# Patient Record
Sex: FEMALE | Race: BLACK OR AFRICAN AMERICAN | NOT HISPANIC OR LATINO | URBAN - METROPOLITAN AREA
[De-identification: names, ages, dates, MRNs, and addresses within clinical notes are randomized per-mention and may not be internally consistent; named-entity substitution may affect disease eponyms.]

---

## 2022-07-06 ENCOUNTER — EVALUATION (OUTPATIENT)
Dept: PHYSICAL THERAPY | Facility: CLINIC | Age: 62
End: 2022-07-06
Payer: COMMERCIAL

## 2022-07-06 DIAGNOSIS — M25.562 PAIN IN BOTH KNEES, UNSPECIFIED CHRONICITY: Primary | ICD-10-CM

## 2022-07-06 DIAGNOSIS — R26.9 GAIT ABNORMALITY: ICD-10-CM

## 2022-07-06 DIAGNOSIS — M25.551 RIGHT HIP PAIN: ICD-10-CM

## 2022-07-06 DIAGNOSIS — M25.561 PAIN IN BOTH KNEES, UNSPECIFIED CHRONICITY: Primary | ICD-10-CM

## 2022-07-06 PROCEDURE — 97161 PT EVAL LOW COMPLEX 20 MIN: CPT

## 2022-07-06 NOTE — PROGRESS NOTES
PT Evaluation     Today's date: 2022  Patient name: Jean-Claude Quintero  : 1960  MRN: [id number]  Referring provider: Tip Bahena MD  Dx:   Encounter Diagnosis     ICD-10-CM    1  Pain in both knees, unspecified chronicity  M25 561     M25 562    2  Right hip pain  M25 551    3  Gait abnormality  R26 9                   Assessment  Assessment details: Jean-Claude Quintero is a 63 y o  female who presents with pain, decreased strength, decreased ROM and ambulatory dysfunction  Due to these impairments, patient has difficulty performing ADL's, recreational activities, ambulation, stair negotiation, transfers, and dressing  She demonstrates fair gait, valgus noted with stair negotiation, which correlates with considerable weakness in hip abductors R>L  Pain with knee flexion bilaterally contributes to limitations in donning and doffing shoes  Decreased endurance further limits her ability to ambulate extended periods such as with grocery shopping  Patient's clinical presentation is consistent with their referring diagnosis  Potential contributes from lumbar spine were not ruled out due to time constraints, further lumbar examination is advised to appropriately assess if lumbar spine is contributing to pain or reports of tingling in RLE  Patient has been educated in home exercise program and plan of care  Patient would benefit from skilled physical therapy services to address their aforementioned functional limitations and progress towards prior level of function and independence with home exercise program    Impairments: abnormal gait, abnormal or restricted ROM, activity intolerance, impaired balance, impaired physical strength, lacks appropriate home exercise program, pain with function, weight-bearing intolerance and poor body mechanics  Understanding of Dx/Px/POC: good   Prognosis: good    Goals  ST    Improve right hip ROM so that pt will be able to don/doff shoes without pain within 2-4 weeks   2   Improve right hip strength by 1/2 grade in all deficient planes within 2-4 weeks  3   Improve right hip and strength so that pt is able to ambulate with normal gait pattern for 2 miles within 2-4 weeks  4  Pt will be independent with HEP   5  Pt will demonstrate improved SLS to 30s EO to improve her tolerance for walking 1 mile on uneven track     LT  Improve right hip strength so that pt is able to negotiate stairs reciprocally without pain within 4-8 weeks  2  Improve SLS balance with eyes closed to greater than 15 sec within 4-8 weeks  3  Improve right hip strength to be able to squat and lift >20lbs without discomfort within 4-8 weeks  4  Pt will be independent with HEP upon discharge   5  Pt will be able to perform SLS at least 15s EC to allow her to walk unrestricted on uneven surfaces     Plan  Plan details: HEP development, stretching, strengthening, A/AA/PROM, joint mobilizations, posture education, STM/MI as needed to reduce muscle tension, balance and proprioceptive training, muscle reeducation, PLOC discussed and agreed upon with patient  Patient would benefit from: PT eval and skilled physical therapy  Planned modality interventions: cryotherapy and thermotherapy: hydrocollator packs  Planned therapy interventions: manual therapy, neuromuscular re-education, self care, therapeutic activities, therapeutic exercise and home exercise program  Frequency: 2x week  Duration in weeks: 12  Treatment plan discussed with: patient        Subjective Evaluation    History of Present Illness  Mechanism of injury: Azra Cochran reports her issues started with her knees  She notes that she has tried injections and a scope on the left  She notes that she also tried gel injections in the last few months which also didn't help  She notes that the right hip has also started hurting which her doctor believes is bursitis and gave an injection   She notes that with extended walking the knees swell up  She reports difficulty with ascending but able to walk reciprocally, has to do step to pattern leading with the right  She notes she avoids uneven surfaces  She notes that sleeping bothers the hip the most, reporting that the pain comes on no matter what position she is in  She notes that the injection did help her hip in regards to sleeping, but the pain is still there during the day  She notes she takes aleve which only takes the edge off the pain  She reports pain and difficulty with bending and straightening the knees  She notes that when the right knee locks up it feels like its on the lateral side, but the aching is in the front of the knee and is tender to the touch as well  She also notes that she gets intermittent tingling in the right lateral thigh  MRI performed on bilateral knees "years ago" which shows small tearing bilaterally, reports she had the left knee "cleaned out years ago"      Left knee:   Current: 3/10  Best: 3/10  Worst: 8/10    Right knee:   Current: 7/10  Best: 5/10  Worst: 10/10    Right hip: sharp/stabbing   Current: 4/10  Best: 4/10  Worst: 10/10  Quality of life: good    Pain  Quality: dull ache (right knee feels like it is going to out)  Relieving factors: rest  Aggravating factors: stair climbing, walking and lifting  Progression: worsening    Social Support  Steps to enter house: yes  Stairs in house: yes   Lives in: multiple-level home  Lives with: spouse    Employment status: not working  Hand dominance: right      Diagnostic Tests  X-ray: normal  MRI studies: abnormal (years ago - meniscus tears bilaterally)  Patient Goals  Patient goals for therapy: decreased pain, improved balance, increased motion, return to work, return to [employer] activities, independence with ADLs/IADLs and increased strength  Patient goal: be able to walk 5 miles a day, relieve hip pain to improve ability pushing a shopping cart        Objective     Static Posture     Comments  Weight shift to left     Palpation     Right   Tenderness of the gluteus medius and TFL  Tenderness     Right Hip   Tenderness in the greater trochanter  Left Knee   Tenderness in the patellar tendon  Right Knee   Tenderness in the patellar tendon  Neurological Testing     Sensation     Lumbar   Left   Intact: light touch    Right   Intact: light touch    Knee   Left Knee   Intact: light touch    Right Knee   Intact: light touch     Active Range of Motion   Left Hip   Flexion: 98 degrees   External rotation (90/90): 25 degrees   Internal rotation (90/90): 26 degrees     Right Hip   Flexion: 90 degrees   External rotation (90/90): 28 degrees   Internal rotation (90/90): 25 degrees   Left Knee   Hyperextension  Flexion: 110 degrees   Extension: -2 degrees     Right Knee   Flexion: 110 degrees   Extension: 1 (lacking 1 degree of ext ) degrees     Additional Active Range of Motion Details  Flexion: to knee   Extension: 80%  SL L: WFL  SB R: WFL      Mobility   Patellar Mobility:   Left Knee   Hypomobile: left medial, left lateral, left superior and left inferior    Right Knee   Hypomobile: medial, lateral, superior and inferior   Mechanical Assessment    Cervical      Thoracic      Lumbar    Standing flexion: repeated movements   Pain intensity: better  Pain level: decreased  Standing extension: repeated movements  Pain intensity: worse  Pain level: increased    Strength/Myotome Testing     Left Hip   Planes of Motion   Flexion: 4+  Extension: 4  Abduction: 4-  External rotation: 4-  Internal rotation: 4    Right Hip   Planes of Motion   Flexion: 4+  Extension: 4-  Abduction: 3+  External rotation: 3+  Internal rotation: 4-    Left Knee   Flexion: 4+  Extension: 4+    Right Knee   Flexion: 4  Extension: 4+    Additional Strength Details  Hip pain with resisted R hip internal rotation   Pain on R with MMT     Tests     Left Hip   Positive piriformis  Negative BENNETTMELANIE and bradly       Right Hip   Positive piriformis  Negative BENNETT and FADIR  Left Knee   Negative anterior drawer, lateral Tawana, medial Tawana, valgus stress test at 0 degrees, valgus stress test at 30 degrees, varus stress test at 0 degrees and varus stress test at 30 degrees  Right Knee   Positive lateral Tawana and medial Tawana  Negative anterior drawer, valgus stress test at 0 degrees, valgus stress test at 30 degrees, varus stress test at 0 degrees and varus stress test at 30 degrees  Additional Tests Details  + slump on R     Functional Assessment      Squat    Pain and sitting toward left side  Forward Step Up 8"   Left Leg  Pain  Right Leg  Pain and valgus  Forward Step Down 8"   Left Leg  Pain  Right Leg  Pain  Single Leg Stance - Eyes Open   Left  Trial 1: 16 seconds    Right  Trial 1: 12 seconds  Comments: Right hip and knee pain reported         Comments  STS: able to perform without UE use, considerable weight shift to left observed     General Comments:      Lumbar Comments  Further assessment of lumbar spine is advised, limited due to time constraints       Flowsheet Rows    Flowsheet Row Most Recent Value   PT/OT G-Codes    Current Score 29   Projected Score 56   FOTO information reviewed Yes             Precautions: standard   HEP created through [address] [place] [address], access code:  IE: 7/6     IE   Manuals 7/6                   Neuro Re-Ed                                Ther Ex    Piriformis stretch  Ed on performance    Mod chaparrita stretch    Hamstring stretch     Quad set/SLR     Bike     Supine hip ER/IR     Clamshell         Ther Activity            Gait Training            Modalities                Insurance:  AMA/CMS Eval/ Re-eval Auth #/ Referral # Total   Visits  Start date  Expiration date Extension  Visit limitation? PT only or  PT+OT?  Co-Insurance   [facility] 7/6        BOMN  $30 Co-pay                                                         AUTH #:  Date               Visits  Authed:  Used Remaining

## 2022-07-06 NOTE — LETTER
2022    Radha Bahena MD  [address] [place] [address]    Patient: Nato Champagne   YOB: 1960   Date of Visit: 2022     Encounter Diagnosis     ICD-10-CM    1  Pain in both knees, unspecified chronicity  M25 561     M25 562    2  Right hip pain  M25 551    3  Gait abnormality  R26 9        Dear Dr Bahena: Thank you for your recent referral of Nato Champagne  Please review the attached evaluation summary from Gloria's recent visit  Please verify that you agree with the plan of care by signing the attached order  If you have any questions or concerns, please do not hesitate to call  I sincerely appreciate the opportunity to share in the care of one of your patients and hope to have another opportunity to work with you in the near future  Sincerely,    Konrad Moffett, PT      Referring Provider:      I certify that I have read the below Plan of Care and certify the need for these services furnished under this plan of treatment while under my care  Radha Bahena MD  [address] [place] [address]  Via Fax: [phone]          PT Evaluation     Today's date: 2022  Patient name: Nato Champagne  : 1960  MRN: [id number]  Referring provider: Radha Bahena MD  Dx:   Encounter Diagnosis     ICD-10-CM    1  Pain in both knees, unspecified chronicity  M25 561     M25 562    2  Right hip pain  M25 551    3  Gait abnormality  R26 9                   Assessment  Assessment details: aNto Champagne is a 63 y o  female who presents with pain, decreased strength, decreased ROM and ambulatory dysfunction  Due to these impairments, patient has difficulty performing ADL's, recreational activities, ambulation, stair negotiation, transfers, and dressing  She demonstrates fair gait, valgus noted with stair negotiation, which correlates with considerable weakness in hip abductors R>L   Pain with knee flexion bilaterally contributes to limitations in donning and doffing shoes  Decreased endurance further limits her ability to ambulate extended periods such as with grocery shopping  Patient's clinical presentation is consistent with their referring diagnosis  Potential contributes from lumbar spine were not ruled out due to time constraints, further lumbar examination is advised to appropriately assess if lumbar spine is contributing to pain or reports of tingling in RLE  Patient has been educated in home exercise program and plan of care  Patient would benefit from skilled physical therapy services to address their aforementioned functional limitations and progress towards prior level of function and independence with home exercise program    Impairments: abnormal gait, abnormal or restricted ROM, activity intolerance, impaired balance, impaired physical strength, lacks appropriate home exercise program, pain with function, weight-bearing intolerance and poor body mechanics  Understanding of Dx/Px/POC: good   Prognosis: good    Goals  ST  Improve right hip ROM so that pt will be able to don/doff shoes without pain within 2-4 weeks  2   Improve right hip strength by 1/2 grade in all deficient planes within 2-4 weeks  3   Improve right hip and strength so that pt is able to ambulate with normal gait pattern for 2 miles within 2-4 weeks  4  Pt will be independent with HEP   5  Pt will demonstrate improved SLS to 30s EO to improve her tolerance for walking 1 mile on uneven track     LT  Improve right hip strength so that pt is able to negotiate stairs reciprocally without pain within 4-8 weeks  2  Improve SLS balance with eyes closed to greater than 15 sec within 4-8 weeks  3  Improve right hip strength to be able to squat and lift >20lbs without discomfort within 4-8 weeks  4  Pt will be independent with HEP upon discharge   5   Pt will be able to perform SLS at least 15s EC to allow her to walk unrestricted on uneven surfaces Plan  Plan details: HEP development, stretching, strengthening, A/AA/PROM, joint mobilizations, posture education, STM/MI as needed to reduce muscle tension, balance and proprioceptive training, muscle reeducation, PLOC discussed and agreed upon with patient  Patient would benefit from: PT eval and skilled physical therapy  Planned modality interventions: cryotherapy and thermotherapy: hydrocollator packs  Planned therapy interventions: manual therapy, neuromuscular re-education, self care, therapeutic activities, therapeutic exercise and home exercise program  Frequency: 2x week  Duration in weeks: 12  Treatment plan discussed with: patient        Subjective Evaluation    History of Present Illness  Mechanism of injury: Tomy Parham reports her issues started with her knees  She notes that she has tried injections and a scope on the left  She notes that she also tried gel injections in the last few months which also didn't help  She notes that the right hip has also started hurting which her doctor believes is bursitis and gave an injection  She notes that with extended walking the knees swell up  She reports difficulty with ascending but able to walk reciprocally, has to do step to pattern leading with the right  She notes she avoids uneven surfaces  She notes that sleeping bothers the hip the most, reporting that the pain comes on no matter what position she is in  She notes that the injection did help her hip in regards to sleeping, but the pain is still there during the day  She notes she takes aleve which only takes the edge off the pain  She reports pain and difficulty with bending and straightening the knees  She notes that when the right knee locks up it feels like its on the lateral side, but the aching is in the front of the knee and is tender to the touch as well  She also notes that she gets intermittent tingling in the right lateral thigh       MRI performed on bilateral knees "years ago" which shows small tearing bilaterally, reports she had the left knee "cleaned out years ago"  Left knee:   Current: 3/10  Best: 3/10  Worst: 8/10    Right knee:   Current: 7/10  Best: 5/10  Worst: 10/10    Right hip: sharp/stabbing   Current: 4/10  Best: 4/10  Worst: 10/10  Quality of life: good    Pain  Quality: dull ache (right knee feels like it is going to out)  Relieving factors: rest  Aggravating factors: stair climbing, walking and lifting  Progression: worsening    Social Support  Steps to enter house: yes  Stairs in house: yes   Lives in: multiple-level home  Lives with: spouse    Employment status: not working  Hand dominance: right      Diagnostic Tests  X-ray: normal  MRI studies: abnormal (years ago - meniscus tears bilaterally)  Patient Goals  Patient goals for therapy: decreased pain, improved balance, increased motion, return to work, return to [employer] activities, independence with ADLs/IADLs and increased strength  Patient goal: be able to walk 5 miles a day, relieve hip pain to improve ability pushing a shopping cart        Objective     Static Posture     Comments  Weight shift to left     Palpation     Right   Tenderness of the gluteus medius and TFL  Tenderness     Right Hip   Tenderness in the greater trochanter  Left Knee   Tenderness in the patellar tendon  Right Knee   Tenderness in the patellar tendon       Neurological Testing     Sensation     Lumbar   Left   Intact: light touch    Right   Intact: light touch    Knee   Left Knee   Intact: light touch    Right Knee   Intact: light touch     Active Range of Motion   Left Hip   Flexion: 98 degrees   External rotation (90/90): 25 degrees   Internal rotation (90/90): 26 degrees     Right Hip   Flexion: 90 degrees   External rotation (90/90): 28 degrees   Internal rotation (90/90): 25 degrees   Left Knee   Hyperextension  Flexion: 110 degrees   Extension: -2 degrees     Right Knee   Flexion: 110 degrees   Extension: 1 (lacking 1 degree of ext ) degrees     Additional Active Range of Motion Details  Flexion: to knee   Extension: 80%  SL L: WFL  SB R: WFL      Mobility   Patellar Mobility:   Left Knee   Hypomobile: left medial, left lateral, left superior and left inferior    Right Knee   Hypomobile: medial, lateral, superior and inferior   Mechanical Assessment    Cervical      Thoracic      Lumbar    Standing flexion: repeated movements   Pain intensity: better  Pain level: decreased  Standing extension: repeated movements  Pain intensity: worse  Pain level: increased    Strength/Myotome Testing     Left Hip   Planes of Motion   Flexion: 4+  Extension: 4  Abduction: 4-  External rotation: 4-  Internal rotation: 4    Right Hip   Planes of Motion   Flexion: 4+  Extension: 4-  Abduction: 3+  External rotation: 3+  Internal rotation: 4-    Left Knee   Flexion: 4+  Extension: 4+    Right Knee   Flexion: 4  Extension: 4+    Additional Strength Details  Hip pain with resisted R hip internal rotation   Pain on R with MMT     Tests     Left Hip   Positive piriformis  Negative BENNETT, FADIR and scour  Right Hip   Positive piriformis  Negative BENNETT and FADIR  Left Knee   Negative anterior drawer, lateral Tawana, medial Tawana, valgus stress test at 0 degrees, valgus stress test at 30 degrees, varus stress test at 0 degrees and varus stress test at 30 degrees  Right Knee   Positive lateral Tawana and medial Tawana  Negative anterior drawer, valgus stress test at 0 degrees, valgus stress test at 30 degrees, varus stress test at 0 degrees and varus stress test at 30 degrees  Additional Tests Details  + slump on R     Functional Assessment      Squat    Pain and sitting toward left side  Forward Step Up 8"   Left Leg  Pain  Right Leg  Pain and valgus  Forward Step Down 8"   Left Leg  Pain  Right Leg  Pain       Single Leg Stance - Eyes Open   Left  Trial 1: 16 seconds    Right  Trial 1: 12 seconds  Comments: Right hip and knee pain reported         Comments  STS: able to perform without UE use, considerable weight shift to left observed     General Comments:      Lumbar Comments  Further assessment of lumbar spine is advised, limited due to time constraints       Flowsheet Rows    Flowsheet Row Most Recent Value   PT/OT G-Codes    Current Score 29   Projected Score 56   FOTO information reviewed Yes             Precautions: standard   HEP created through [address] [place] [address], access code:  IE: 7/6     IE   Manuals 7/6                   Neuro Re-Ed                                Ther Ex    Piriformis stretch  Ed on performance    Mod chaparrita stretch    Hamstring stretch     Quad set/SLR     Bike     Supine hip ER/IR     Clamshell         Ther Activity            Gait Training            Modalities                Insurance:  AMA/CMS Eval/ Re-eval Auth #/ Referral # Total   Visits  Start date  Expiration date Extension  Visit limitation? PT only or  PT+OT?  Co-Insurance   [facility] 7/6        BOMN  $30 Co-pay                                                         AUTH #:  Date               Visits  Authed:  Used                Remaining

## 2022-07-12 ENCOUNTER — OFFICE VISIT (OUTPATIENT)
Dept: PHYSICAL THERAPY | Facility: CLINIC | Age: 62
End: 2022-07-12
Payer: COMMERCIAL

## 2022-07-12 DIAGNOSIS — M25.562 PAIN IN BOTH KNEES, UNSPECIFIED CHRONICITY: Primary | ICD-10-CM

## 2022-07-12 DIAGNOSIS — R26.9 GAIT ABNORMALITY: ICD-10-CM

## 2022-07-12 DIAGNOSIS — M25.561 PAIN IN BOTH KNEES, UNSPECIFIED CHRONICITY: Primary | ICD-10-CM

## 2022-07-12 DIAGNOSIS — M25.551 RIGHT HIP PAIN: ICD-10-CM

## 2022-07-12 PROCEDURE — 97140 MANUAL THERAPY 1/> REGIONS: CPT

## 2022-07-12 PROCEDURE — 97110 THERAPEUTIC EXERCISES: CPT

## 2022-07-12 NOTE — PROGRESS NOTES
Daily Note     Today's date: 2022  Patient name: Evelyne Mosley  : 1960  MRN: [id number]  Referring provider: Tip Bahena MD  Dx:   Encounter Diagnosis     ICD-10-CM    1  Pain in both knees, unspecified chronicity  M25 561     M25 562    2  Right hip pain  M25 551    3  Gait abnormality  R26 9                   Subjective: Evelyne Mosley reports that her right knee has not bothered her but the left has been acting up  She notes that the right hip has calmed down some but she hasnt been walking or doing much of her normal exercise  Objective: See treatment diary below      Assessment: Tolerated treatment well  Patellar hypomobility impacting extension on the left, as well tibiofemoral restrictions on the right  She deos demonstrate improved tolerance for SLR following mobilization  Educated to initiate the bike at home for ROM  Patient demonstrated fatigue post treatment, exhibited good technique with therapeutic exercises and would benefit from continued PT to address mobility deficits, weakness, and reduce pain  Plan: Continue per plan of care        Precautions: standard   HEP created through [address] [place] [address], access code: [facility]   IE:      2 IE   Manuals    Mobs  Inferior patellar mobs grade IV; end-range extension mobs                    Neuro Re-Ed                                        Ther Ex     Piriformis stretch   Ed on performance    Mod chaparrita stretch 3x30s ea    Hamstring stretch  2x30s ea    SLR  Flexion 15x belinda     Bike  5 min     Supine hip ER/IR      Clamshell      Heel slide with quad set  10x5s hold belinda     gastroc stretch  2x30s ea               Gait Training               Modalities

## 2022-07-14 ENCOUNTER — OFFICE VISIT (OUTPATIENT)
Dept: PHYSICAL THERAPY | Facility: CLINIC | Age: 62
End: 2022-07-14
Payer: COMMERCIAL

## 2022-07-14 DIAGNOSIS — M25.562 PAIN IN BOTH KNEES, UNSPECIFIED CHRONICITY: Primary | ICD-10-CM

## 2022-07-14 DIAGNOSIS — M25.561 PAIN IN BOTH KNEES, UNSPECIFIED CHRONICITY: Primary | ICD-10-CM

## 2022-07-14 DIAGNOSIS — R26.9 GAIT ABNORMALITY: ICD-10-CM

## 2022-07-14 DIAGNOSIS — M25.551 RIGHT HIP PAIN: ICD-10-CM

## 2022-07-14 PROCEDURE — 97110 THERAPEUTIC EXERCISES: CPT

## 2022-07-14 PROCEDURE — 97140 MANUAL THERAPY 1/> REGIONS: CPT

## 2022-07-14 PROCEDURE — 97112 NEUROMUSCULAR REEDUCATION: CPT

## 2022-07-19 ENCOUNTER — OFFICE VISIT (OUTPATIENT)
Dept: PHYSICAL THERAPY | Facility: CLINIC | Age: 62
End: 2022-07-19
Payer: COMMERCIAL

## 2022-07-19 DIAGNOSIS — M25.551 RIGHT HIP PAIN: ICD-10-CM

## 2022-07-19 DIAGNOSIS — R26.9 GAIT ABNORMALITY: ICD-10-CM

## 2022-07-19 DIAGNOSIS — M25.561 PAIN IN BOTH KNEES, UNSPECIFIED CHRONICITY: Primary | ICD-10-CM

## 2022-07-19 DIAGNOSIS — M25.562 PAIN IN BOTH KNEES, UNSPECIFIED CHRONICITY: Primary | ICD-10-CM

## 2022-07-19 PROCEDURE — 97110 THERAPEUTIC EXERCISES: CPT

## 2022-07-19 PROCEDURE — 97140 MANUAL THERAPY 1/> REGIONS: CPT

## 2022-07-19 NOTE — PROGRESS NOTES
Case Management Discharge Note    Final Note: home     Destination      No service has been selected for the patient.      Durable Medical Equipment      No service has been selected for the patient.      Dialysis/Infusion      No service has been selected for the patient.      Home Medical Care      No service has been selected for the patient.      Therapy      No service has been selected for the patient.      Community Resources      No service has been selected for the patient.        Transportation Services  Private: Car    Final Discharge Disposition Code: 01 - home or self-care   Daily Note     Today's date: 2022  Patient name: Trinh Robertson  : 1960  MRN: [id number]  Referring provider: Craig Bahena MD  Dx:   Encounter Diagnosis     ICD-10-CM    1  Pain in both knees, unspecified chronicity  M25 561     M25 562    2  Right hip pain  M25 551    3  Gait abnormality  R26 9                   Subjective: Trinh Robertson reports she had some knee pain after last session but it has decreased  She notes she was able to walk for 1 hour this morning  She notes that her right knee feels straighter  Objective: See treatment diary below      Assessment: Tolerated treatment well  Good tolerance for step ups today, reporting tightness but not pain  Educated on self patellar mobs to perform to address hypomobility  Reviewed sit to stands with use of airex, cues required to avoid dynamic knee valgus on R, improved following cues  Patient demonstrated fatigue post treatment, exhibited good technique with therapeutic exercises and would benefit from continued PT to progress mobility, strength, endurance, and facilitate improved performance of ADLs  Plan: Continue per plan of care        Precautions: standard   HEP created through [address] [place] [address], access code: [facility]   IE:      4 3 2 IE   Manuals    Mobs  Inferior patellar mobs, post tibfem grade IV, tibiofemoral distraction off edge of table  Inferior patellar mobs, post tibfem grade IV, tibiofemoral distraction off edge of table  Inferior patellar mobs grade IV; end-range extension mobs                          Neuro Re-Ed       Skaters   2x10 5s hold      Step ups  6" 2x10 ea  4" 15x belinda                                         Ther Ex       Piriformis stretch     Ed on performance    Mod chaparrita stretch   3x30s ea    Hamstring stretch    2x30s ea    SLR  LAQ 5lbs 2x15 ea   Flexion 15x belinda     Bike  5 min  5 min  5 min     Supine hip ER/IR        Clamshell        Heel slide with quad set  Knee flexion on stretch 10x ea   10x5s hold belinda     gastroc stretch    2x30s ea     STS  airex 2x10 airex 10x     Mini squat   10x     Gait Training                     Modalities                         Insurance:  AMA/CMS Eval/ Re-eval Auth #/ Referral # Total   Visits  Start date  Expiration date Extension  Visit limitation? PT only or  PT+OT?  Co-Insurance   [facility] 7/6        BOMN  $30 Co-pay                                                         AUTH #:  Date               Visits  Authed:  Used                Remaining

## 2022-07-21 ENCOUNTER — OFFICE VISIT (OUTPATIENT)
Dept: PHYSICAL THERAPY | Facility: CLINIC | Age: 62
End: 2022-07-21
Payer: COMMERCIAL

## 2022-07-21 DIAGNOSIS — M25.551 RIGHT HIP PAIN: ICD-10-CM

## 2022-07-21 DIAGNOSIS — M25.561 PAIN IN BOTH KNEES, UNSPECIFIED CHRONICITY: Primary | ICD-10-CM

## 2022-07-21 DIAGNOSIS — R26.9 GAIT ABNORMALITY: ICD-10-CM

## 2022-07-21 DIAGNOSIS — M25.562 PAIN IN BOTH KNEES, UNSPECIFIED CHRONICITY: Primary | ICD-10-CM

## 2022-07-21 PROCEDURE — 97140 MANUAL THERAPY 1/> REGIONS: CPT

## 2022-07-21 PROCEDURE — 97112 NEUROMUSCULAR REEDUCATION: CPT

## 2022-07-21 PROCEDURE — 97110 THERAPEUTIC EXERCISES: CPT

## 2022-07-21 NOTE — PROGRESS NOTES
Daily Note     Today's date: 2022  Patient name: Bessie Goel  : 1960  MRN: [id number]  Referring provider: Gucci Bahena MD  Dx:   Encounter Diagnosis     ICD-10-CM    1  Pain in both knees, unspecified chronicity  M25 561     M25 562    2  Right hip pain  M25 551    3  Gait abnormality  R26 9                   Subjective: Bessie Goel reports her knees have been feeling better  She notes that yesterday she went for a walk and had some knee and hip pain which subsided by the evening  Objective: See treatment diary below      Assessment: Tolerated treatment well  Good tolerance for clamshells and SLR abduction with cues provided  Increased difficulty with hip ABD R>L  addition of step downs today with pelvic drop noted bilaterally  Addition of hip drops to promote unerstanding of neutral pelvis, good tolerance  Patient demonstrated fatigue post treatment, exhibited good technique with therapeutic exercises and would benefit from continued PT to progress strength, endurance and tolerance for ADLS  Plan: Continue per plan of care  Precautions: standard   HEP created through [address] [place] [address], access code: [email or web address]   IE:      5 4 3 2 IE   Manuals    Mobs  tibiofemoral distraction off edge of table;  Inferior patellar mobs, Inferior patellar mobs, post tibfem grade IV, tibiofemoral distraction off edge of table  Inferior patellar mobs, post tibfem grade IV, tibiofemoral distraction off edge of table  Inferior patellar mobs grade IV; end-range extension mobs                             Neuro Re-Ed        Skaters    2x10 5s hold      Step ups   6" 2x10 ea  4" 15x belinda      Step downs  10x ea 4"        Hip drops standing  10x ea at mirror                                Ther Ex        Piriformis stretch      Ed on performance    Mod chaparrita stretch    3x30s ea    Hamstring stretch     2x30s ea    SLR  ABD 3x5 belinda  LAQ 5lbs 2x15 ea   Flexion 15x belinda     Bike 5 min  5 min  5 min     Supine hip ER/IR         Clamshell  2x10        Heel slide with quad set   Knee flexion on stretch 10x ea   10x5s hold belinda     gastroc stretch     2x30s ea     STS   airex 2x10 airex 10x     Mini squat  2x10 at high low table   10x     SLR ABD + flex                        Modalities                            Insurance:  AMA/CMS Eval/ Orval Jetty #/ Referral # Total   Visits  Start date  Expiration date Extension  Visit limitation? PT only or  PT+OT?  Co-Insurance   [facility] 7/6        BOMN  $30 Co-pay                                                         AUTH #:  Date               Visits  Authed:  Used                Remaining

## 2022-07-25 ENCOUNTER — OFFICE VISIT (OUTPATIENT)
Dept: PHYSICAL THERAPY | Facility: CLINIC | Age: 62
End: 2022-07-25
Payer: COMMERCIAL

## 2022-07-25 DIAGNOSIS — M25.551 RIGHT HIP PAIN: ICD-10-CM

## 2022-07-25 DIAGNOSIS — M25.561 PAIN IN BOTH KNEES, UNSPECIFIED CHRONICITY: Primary | ICD-10-CM

## 2022-07-25 DIAGNOSIS — M25.562 PAIN IN BOTH KNEES, UNSPECIFIED CHRONICITY: Primary | ICD-10-CM

## 2022-07-25 DIAGNOSIS — R26.9 GAIT ABNORMALITY: ICD-10-CM

## 2022-07-25 PROCEDURE — 97140 MANUAL THERAPY 1/> REGIONS: CPT

## 2022-07-25 PROCEDURE — 97110 THERAPEUTIC EXERCISES: CPT

## 2022-07-25 NOTE — PROGRESS NOTES
Daily Note     Today's date: 2022  Patient name: Ed Santamaria  : 1960  MRN: [id number]  Referring provider: Giovanna Bahena MD  Dx:   Encounter Diagnosis     ICD-10-CM    1  Pain in both knees, unspecified chronicity  M25 561     M25 562    2  Right hip pain  M25 551    3  Gait abnormality  R26 9                   Subjective: Ed Santamaria reports she was able to do some walking on Friday and could "feel the hip" but notes it wasn't painful like it has been previously  She notes the knees feel still  Objective: See treatment diary below      Assessment: Tolerated treatment well  Hypomobility into extension noted on the right, addressed with mobilizations, hamstring and gastroc stretching, and TKEs  Educated on performance of low load prolonged knee extension on R  Weakness with hip ABD in sidelying as well standing hip drops  Patient demonstrated fatigue post treatment, exhibited good technique with therapeutic exercises and would benefit from continued PT to progress strengthening, range of motion, and facilitate progression to ADLs  Plan: Continue per plan of care  Precautions: standard   HEP created through [address] [place] [address], access code: [facility]   IE:      6 5 4 3 2   Manuals    Mobs  End-range ext mobs on R 4 min  tibiofemoral distraction off edge of table;  Inferior patellar mobs, Inferior patellar mobs, post tibfem grade IV, tibiofemoral distraction off edge of table  Inferior patellar mobs, post tibfem grade IV, tibiofemoral distraction off edge of table  Inferior patellar mobs grade IV; end-range extension mobs                            Neuro Re-Ed        Skaters     2x10 5s hold     Step ups    6" 2x10 ea  4" 15x belinda     Step downs   10x ea 4"       Hip drops standing  2x10 belinda   10x ea at mirror                               Ther Ex        Piriformis stretch         Mod chaparrita stretch     3x30s ea   Hamstring stretch  3x30s     2x30s ea SLR  abd 2x10 belinda  ABD 3x5 belinda  LAQ 5lbs 2x15 ea   Flexion 15x belinda    Bike  5 min   5 min  5 min  5 min    Supine hip ER/IR         Clamshell  2x10 belinda 2x10       Heel slide with quad set    Knee flexion on stretch 10x ea   10x5s hold belinda    gastroc stretch  3x30s     2x30s ea    STS  10x with airex   10x no airex   airex 2x10 airex 10x    Mini squat   2x10 at high low table   10x    bridge 3x10 green                        Modalities                            Insurance:  AMA/CMS Eval/ Michael Lao #/ Referral # Visit limitation? PT only or  PT+OT?  Co-Insurance   [facility] 7/6 No auth  BOMN  $30 Co-pay

## 2022-07-27 ENCOUNTER — OFFICE VISIT (OUTPATIENT)
Dept: PHYSICAL THERAPY | Facility: CLINIC | Age: 62
End: 2022-07-27
Payer: COMMERCIAL

## 2022-07-27 DIAGNOSIS — M25.551 RIGHT HIP PAIN: ICD-10-CM

## 2022-07-27 DIAGNOSIS — M25.562 PAIN IN BOTH KNEES, UNSPECIFIED CHRONICITY: Primary | ICD-10-CM

## 2022-07-27 DIAGNOSIS — M25.561 PAIN IN BOTH KNEES, UNSPECIFIED CHRONICITY: Primary | ICD-10-CM

## 2022-07-27 DIAGNOSIS — R26.9 GAIT ABNORMALITY: ICD-10-CM

## 2022-07-27 PROCEDURE — 97110 THERAPEUTIC EXERCISES: CPT

## 2022-07-27 PROCEDURE — 97140 MANUAL THERAPY 1/> REGIONS: CPT

## 2022-07-27 NOTE — PROGRESS NOTES
Daily Note     Today's date: 2022  Patient name: Albaro Bland  : 1960  MRN: [id number]  Referring provider: Dylon Bahena MD  Dx:   Encounter Diagnosis     ICD-10-CM    1  Pain in both knees, unspecified chronicity  M25 561     M25 562    2  Right hip pain  M25 551    3  Gait abnormality  R26 9                   Subjective: Albaro Bland reports that her posterior right knee and medial left knee were a bit irritated after last session  She does note that she has been doing some walking daily and that today her knees are feeling better  She reports that the posterior right knee is not quite as tight  Objective: See treatment diary below      Assessment: Tolerated treatment well  Improved knee extension following end-range mobilizations, HS contract relax, and gastroc stretching  Addition of resisted retro walking to promote terminal knee extension, good tolerance  Good form noted step ups as well as sit to stands, no increase in pain  Patient demonstrated fatigue post treatment, exhibited good technique with therapeutic exercises and would benefit from continued PT to progress end-range extension on right, quad strength, normalize gait and improve endurance with ADLs and walking  Plan: Continue per plan of care  Precautions: standard   HEP created through [address] [place] [address], access code: [facility]   IE:      7 6 5 4   Manuals    Mobs  End-range ext mobs on R 4 min End-range ext mobs on R 4 min  tibiofemoral distraction off edge of table;  Inferior patellar mobs, Inferior patellar mobs, post tibfem grade IV, tibiofemoral distraction off edge of table    Contract relax HS x 4 min R                     Neuro Re-Ed       Skaters        Step ups  Step up with " 10x ea    6" 2x10 ea    Step downs    10x ea 4"     Hip drops standing   2x10 belinda   10x ea at mirror                          Ther Ex       Piriformis stretch        Mod chaparrita stretch Hamstring stretch  Seated 2x30s ea  3x30s      SLR  Standing hip ABD 2x10  abd 2x10 belinda  ABD 3x5 belinda  LAQ 5lbs 2x15 ea    Bike  5 min  5 min   5 min    Supine hip ER/IR        Clamshell   2x10 belinda 2x10     Heel slide with quad set     Knee flexion on stretch 10x ea    gastroc stretch  3x30s belinda  3x30s      STS  10x with airex  10x with airex   10x no airex   airex 2x10   Mini squat    2x10 at high low table     bridge  3x10 green      Backward walk at [employer] 3 6 5 laps              Modalities                         Insurance:  AMA/CMS Eval/ Camila Began #/ Referral # Visit limitation? PT only or  PT+OT?  Co-Insurance   [facility] 7/6 No auth  BOMN  $30 Co-pay

## 2022-08-01 ENCOUNTER — OFFICE VISIT (OUTPATIENT)
Dept: PHYSICAL THERAPY | Facility: CLINIC | Age: 62
End: 2022-08-01
Payer: COMMERCIAL

## 2022-08-01 DIAGNOSIS — R26.9 GAIT ABNORMALITY: ICD-10-CM

## 2022-08-01 DIAGNOSIS — M25.551 RIGHT HIP PAIN: ICD-10-CM

## 2022-08-01 DIAGNOSIS — M25.562 PAIN IN BOTH KNEES, UNSPECIFIED CHRONICITY: Primary | ICD-10-CM

## 2022-08-01 DIAGNOSIS — M25.561 PAIN IN BOTH KNEES, UNSPECIFIED CHRONICITY: Primary | ICD-10-CM

## 2022-08-01 PROCEDURE — 97110 THERAPEUTIC EXERCISES: CPT | Performed by: PHYSICAL THERAPIST

## 2022-08-01 PROCEDURE — 97112 NEUROMUSCULAR REEDUCATION: CPT | Performed by: PHYSICAL THERAPIST

## 2022-08-01 NOTE — PROGRESS NOTES
Daily Note     Today's date: 2022  Patient name: Carlos Alberto Robles  : 1960  MRN: [id number]  Referring provider: Caryl Bahena MD  Dx:   Encounter Diagnosis     ICD-10-CM    1  Pain in both knees, unspecified chronicity  M25 561     M25 562    2  Right hip pain  M25 551    3  Gait abnormality  R26 9                   Subjective: Carlos Alberto Robles states she was chasing her granddaughter around this weekend as well as drove to and from [facility]  She states as a result her knees were a little sore and her hip was very sore from drive  Objective: See treatment diary below      Assessment: Tolerated treatment well  Patient with continued lack of TKE R>L, poor inferior patellar mobility  She did report slight decreased right knee pain with manual medial glide of patella during squats and may benefit from trial of medial taping in future  Plan: Continue per plan of care  Precautions: standard   HEP created through [address] [place] [address], access code: [facility]   IE:  6 5 4   Manuals    Mobs  Inf patella B  Med pat glide on R during squats decreased pain  End-range ext B End-range ext mobs on R 4 min End-range ext mobs on R 4 min  tibiofemoral distraction off edge of table;  Inferior patellar mobs, Inferior patellar mobs, post tibfem grade IV, tibiofemoral distraction off edge of table    Contract relax  HS x 4 min R                       Neuro Re-Ed        Skaters         Step ups  Step ups 4" 10x B  6" 20x B Step up with " 10x ea    6" 2x10 ea    Step downs     10x ea 4"     Hip drops standing    2x10 belinda   10x ea at mirror     Seated on/off 6" step w/quad set + SLR 10x B cues to avoid hip ER       Quad sets 15x5s B               Ther Ex        Piriformis stretch         Mod chaparrita stretch        Hamstring stretch  2x30s B Seated 2x30s ea  3x30s      SLR   Standing hip ABD 2x10  abd 2x10 belinda  ABD 3x5 belinda  LAQ 5lbs 2x15 ea    Bike  6 min 5 min  5 min   5 min Supine hip ER/IR         Clamshell    2x10 belinda 2x10     Heel slide with quad set      Knee flexion on stretch 10x ea    gastroc stretch   3x30s belinda  3x30s      STS   10x with airex  10x with airex   10x no airex   airex 2x10   Mini squat     2x10 at high low table     bridge   3x10 green      Backward walk at [employer]  3 6 5 laps               Modalities                            Insurance:  AMA/LE [place]/ Josselyn Albert #/ Referral # Visit limitation? PT only or  PT+OT?  Co-Insurance   [facility] 7/6 No auth  BOMN  $30 Co-pay

## 2022-08-03 ENCOUNTER — APPOINTMENT (OUTPATIENT)
Dept: PHYSICAL THERAPY | Facility: CLINIC | Age: 62
End: 2022-08-03
Payer: COMMERCIAL

## 2022-08-03 NOTE — PROGRESS NOTES
PT Evaluation     Today's date: 8/3/2022  Patient name: Monique Galvan  : 1960  MRN: [id number]  Referring provider: Giselle Bahena MD  Dx:   Encounter Diagnosis     ICD-10-CM    1  Pain in both knees, unspecified chronicity  M25 561     M25 562    2  Right hip pain  M25 551    3  Gait abnormality  R26 9                   Assessment  Assessment details: Monique Galvan has been seen for 8 visits of OPPT in regards to bilateral knee pain and right hip pain  She demonstrates gains in pain free knee motion, most notably extension on the right  Slow gains in patellar mobility noted and pt is proficient in self patellar mobs  Weakness limits negotiation on stairs and with transfers  she would benefit from continued skilled OPPT to address remaining ROM restrictions, strength deficits, and improve pts tolerance for transfers, extended ambulation, don/dof shoes, dressing and regular exercise program      is a 63 y o  female who presents with pain, decreased strength, decreased ROM and ambulatory dysfunction  Due to these impairments, patient has difficulty performing ADL's, recreational activities, ambulation, stair negotiation, transfers, and dressing  She demonstrates fair gait, valgus noted with stair negotiation, which correlates with considerable weakness in hip abductors R>L  Pain with knee flexion bilaterally contributes to limitations in donning and doffing shoes  Decreased endurance further limits her ability to ambulate extended periods such as with grocery shopping  Patient's clinical presentation is consistent with their referring diagnosis  Potential contributes from lumbar spine were not ruled out due to time constraints, further lumbar examination is advised to appropriately assess if lumbar spine is contributing to pain or reports of tingling in RLE  Patient has been educated in home exercise program and plan of care   Patient would benefit from skilled physical therapy services to address their aforementioned functional limitations and progress towards prior level of function and independence with home exercise program    Impairments: abnormal gait, abnormal or restricted ROM, activity intolerance, impaired balance, impaired physical strength, lacks appropriate home exercise program, pain with function, weight-bearing intolerance and poor body mechanics  Understanding of Dx/Px/POC: good   Prognosis: good    Goals  ST  Improve right hip ROM so that pt will be able to don/doff shoes without pain within 2-4 weeks  2   Improve right hip strength by 1/2 grade in all deficient planes within 2-4 weeks  3   Improve right hip and strength so that pt is able to ambulate with normal gait pattern for 2 miles within 2-4 weeks  4  Pt will be independent with HEP - MET  5  Pt will demonstrate improved SLS to 30s EO to improve her tolerance for walking 1 mile on uneven track     LT  Improve right hip strength so that pt is able to negotiate stairs reciprocally without pain within 4-8 weeks  2  Improve SLS balance with eyes closed to greater than 15 sec within 4-8 weeks  3  Improve right hip strength to be able to squat and lift >20lbs without discomfort within 4-8 weeks  4  Pt will be independent with HEP upon discharge   5  Pt will be able to perform SLS at least 15s EC to allow her to walk unrestricted on uneven surfaces     Plan  Plan details: HEP development, stretching, strengthening, A/AA/PROM, joint mobilizations, posture education, STM/MI as needed to reduce muscle tension, balance and proprioceptive training, muscle reeducation, PLOC discussed and agreed upon with patient      Patient would benefit from: PT eval and skilled physical therapy  Planned modality interventions: cryotherapy and thermotherapy: hydrocollator packs  Planned therapy interventions: manual therapy, neuromuscular re-education, self care, therapeutic activities, therapeutic exercise and home exercise program  Frequency: 2x week  Duration in weeks: 12  Treatment plan discussed with: patient        Subjective Evaluation    History of Present Illness  Mechanism of injury: Mercy Carmichael reports her issues started with her knees  She notes that she has tried injections and a scope on the left  She notes that she also tried gel injections in the last few months which also didn't help  She notes that the right hip has also started hurting which her doctor believes is bursitis and gave an injection  She notes that with extended walking the knees swell up  She reports difficulty with ascending but able to walk reciprocally, has to do step to pattern leading with the right  She notes she avoids uneven surfaces  She notes that sleeping bothers the hip the most, reporting that the pain comes on no matter what position she is in  She notes that the injection did help her hip in regards to sleeping, but the pain is still there during the day  She notes she takes aleve which only takes the edge off the pain  She reports pain and difficulty with bending and straightening the knees  She notes that when the right knee locks up it feels like its on the lateral side, but the aching is in the front of the knee and is tender to the touch as well  She also notes that she gets intermittent tingling in the right lateral thigh  MRI performed on bilateral knees "years ago" which shows small tearing bilaterally, reports she had the left knee "cleaned out years ago"      Left knee:   Current: 3/10  Best: 3/10  Worst: 8/10    Right knee:   Current: 7/10  Best: 5/10  Worst: 10/10    Right hip: sharp/stabbing   Current: 4/10  Best: 4/10  Worst: 10/10  Quality of life: good    Pain  Quality: dull ache (right knee feels like it is going to out)  Relieving factors: rest  Aggravating factors: stair climbing, walking and lifting  Progression: worsening    Social Support  Steps to enter house: yes  Stairs in house: yes   Lives in: multiple-level home  Lives with: spouse    Employment status: not working  Hand dominance: right      Diagnostic Tests  X-ray: normal  MRI studies: abnormal (years ago - meniscus tears bilaterally)  Patient Goals  Patient goals for therapy: decreased pain, improved balance, increased motion, return to work, return to [employer] activities, independence with ADLs/IADLs and increased strength  Patient goal: be able to walk 5 miles a day, relieve hip pain to improve ability pushing a shopping cart        Objective     Static Posture     Comments  Weight shift to left     Palpation     Right   Tenderness of the gluteus medius and TFL  Tenderness     Right Hip   Tenderness in the greater trochanter  Left Knee   Tenderness in the patellar tendon  Right Knee   Tenderness in the patellar tendon       Neurological Testing     Sensation     Lumbar   Left   Intact: light touch    Right   Intact: light touch    Knee   Left Knee   Intact: light touch    Right Knee   Intact: light touch     Active Range of Motion   Left Hip   Flexion: 98 degrees   External rotation (90/90): 25 degrees   Internal rotation (90/90): 26 degrees     Right Hip   Flexion: 90 degrees   External rotation (90/90): 28 degrees   Internal rotation (90/90): 25 degrees   Left Knee   Hyperextension  Flexion: 110 degrees   Extension: -2 degrees     Right Knee   Flexion: 110 degrees   Extension: 1 (lacking 1 degree of ext ) degrees     Additional Active Range of Motion Details  Flexion: to knee   Extension: 80%  SL L: WFL  SB R: WFL      Mobility   Patellar Mobility:   Left Knee   Hypomobile: left medial, left lateral, left superior and left inferior    Right Knee   Hypomobile: medial, lateral, superior and inferior   Mechanical Assessment    Cervical      Thoracic      Lumbar    Standing flexion: repeated movements   Pain intensity: better  Pain level: decreased  Standing extension: repeated movements  Pain intensity: worse  Pain level: increased    Strength/Myotome Testing     Left Hip   Planes of Motion   Flexion: 4+  Extension: 4  Abduction: 4-  External rotation: 4-  Internal rotation: 4    Right Hip   Planes of Motion   Flexion: 4+  Extension: 4-  Abduction: 3+  External rotation: 3+  Internal rotation: 4-    Left Knee   Flexion: 4+  Extension: 4+    Right Knee   Flexion: 4  Extension: 4+    Additional Strength Details  Hip pain with resisted R hip internal rotation   Pain on R with MMT     Tests     Left Hip   Positive piriformis  Negative BENNETT, FADIR and scour  Right Hip   Positive piriformis  Negative BENNETT and FADIR  Left Knee   Negative anterior drawer, lateral Tawana, medial Tawana, valgus stress test at 0 degrees, valgus stress test at 30 degrees, varus stress test at 0 degrees and varus stress test at 30 degrees  Right Knee   Positive lateral Tawana and medial Tawana  Negative anterior drawer, valgus stress test at 0 degrees, valgus stress test at 30 degrees, varus stress test at 0 degrees and varus stress test at 30 degrees  Additional Tests Details  + slump on R     Functional Assessment      Squat    Pain and sitting toward left side  Forward Step Up 8"   Left Leg  Pain  Right Leg  Pain and valgus  Forward Step Down 8"   Left Leg  Pain  Right Leg  Pain       Single Leg Stance - Eyes Open   Left  Trial 1: 16 seconds    Right  Trial 1: 12 seconds  Comments: Right hip and knee pain reported         Comments  STS: able to perform without UE use, considerable weight shift to left observed     General Comments:      Lumbar Comments  Further assessment of lumbar spine is advised, limited due to time constraints              Precautions: standard   HEP created through [address] [place] [address], access code: [email or web address]   IE: 7/6  Re-eval 8/3     9 re-eval  8 7 6   Manuals 8/3 8/1 7/27 7/25   Mobs   Inf patella B  Med pat glide on R during squats decreased pain  End-range ext B End-range ext mobs on R 4 min End-range ext mobs on R 4 min    Contract relax   HS x 4 min R                   Neuro Re-Ed       Skaters        Step ups   Step ups 4" 10x B  6" 20x B Step up with march 6" 10x ea     Step downs        Hip drops standing     2x10 belinda     Seated on/off 6" step w/quad set + SLR  10x B cues to avoid hip ER     Quad sets  15x5s B            Ther Ex       Piriformis stretch        Mod chaparrita stretch       Hamstring stretch   2x30s B Seated 2x30s ea  3x30s    SLR    Standing hip ABD 2x10  abd 2x10 belinda    Bike   6 min 5 min  5 min    Supine hip ER/IR        Clamshell     2x10 belinda   Heel slide with quad set        gastroc stretch    3x30s belinda  3x30s    STS    10x with airex  10x with airex   10x no airex    Mini squat        bridge    3x10 green    Backward walk at [employer]   3 6 5 laps            Modalities                         Insurance:  AMA/LE [place]/ Marcia Traina #/ Referral # Visit limitation? PT only or  PT+OT?  Co-Insurance   [facility] 7/6 No auth  BOMN  $30 Co-pay

## 2022-08-08 ENCOUNTER — EVALUATION (OUTPATIENT)
Dept: PHYSICAL THERAPY | Facility: CLINIC | Age: 62
End: 2022-08-08
Payer: COMMERCIAL

## 2022-08-08 DIAGNOSIS — M25.551 RIGHT HIP PAIN: ICD-10-CM

## 2022-08-08 DIAGNOSIS — M25.561 PAIN IN BOTH KNEES, UNSPECIFIED CHRONICITY: Primary | ICD-10-CM

## 2022-08-08 DIAGNOSIS — R26.9 GAIT ABNORMALITY: ICD-10-CM

## 2022-08-08 DIAGNOSIS — M25.562 PAIN IN BOTH KNEES, UNSPECIFIED CHRONICITY: Primary | ICD-10-CM

## 2022-08-08 PROCEDURE — 97110 THERAPEUTIC EXERCISES: CPT

## 2022-08-08 NOTE — PROGRESS NOTES
PT Re-Evaluation     Today's date: 2022  Patient name: Mercy Carmichael  : 1960  MRN: [id number]  Referring provider: Neha Bahnea MD  Dx:   Encounter Diagnosis     ICD-10-CM    1  Pain in both knees, unspecified chronicity  M25 561     M25 562    2  Right hip pain  M25 551    3  Gait abnormality  R26 9                   Assessment  Assessment details: Mercy Carmichael has been seen for 8 visits of OPPT in regards to bilateral knee pain and right hip pain  She demonstrates gains in pain free knee flexion motion, however continued extension deficits on the right  Slow gains in patellar mobility noted and pt is proficient in self patellar mobs  She demonstrates functional gains in strength as demonstrated by improved tolerance for transfers and ascending stairs reciprocally  Eccentric weakness impacts decending stairs  Hip ABD and external rotation weakness still noted which contributes to pain with ambulation  she would benefit from continued skilled OPPT to address remaining ROM restrictions, strength deficits, and improve pts tolerance for transfers, extended ambulation, don/dof shoes, dressing and regular exercise program      Patient has been educated in home exercise program and plan of care  Patient would benefit from skilled physical therapy services to address their aforementioned functional limitations and progress towards prior level of function and independence with home exercise program    Impairments: abnormal gait, abnormal or restricted ROM, activity intolerance, impaired balance, impaired physical strength, lacks appropriate home exercise program, pain with function, weight-bearing intolerance and poor body mechanics  Understanding of Dx/Px/POC: good   Prognosis: good    Goals  ST  Improve right hip ROM so that pt will be able to don/doff shoes without pain within 2-4 weeks  - MET  2  Improve right hip strength by 1/2 grade in all deficient planes within 2-4 weeks   - progressing   3  Improve right hip and strength so that pt is able to ambulate with normal gait pattern for 2 miles within 2-4 weeks  - MET  4  Pt will be independent with HEP - MET  5  Pt will demonstrate improved SLS to 30s EO to improve her tolerance for walking 1 mile on uneven track     LT  Improve right hip strength so that pt is able to negotiate stairs reciprocally without pain within 4-8 weeks - progressing   2  Improve SLS balance with eyes closed to greater than 15 sec within 4-8 weeks  3  Improve right hip strength to be able to squat and lift >20lbs without discomfort within 4-8 weeks  4  Pt will be independent with HEP upon discharge   5  Pt will be able to perform SLS at least 15s EC to allow her to walk unrestricted on uneven surfaces     Plan  Plan details: HEP development, stretching, strengthening, A/AA/PROM, joint mobilizations, posture education, STM/MI as needed to reduce muscle tension, balance and proprioceptive training, muscle reeducation, PLOC discussed and agreed upon with patient  Patient would benefit from: PT eval and skilled physical therapy  Planned modality interventions: cryotherapy and thermotherapy: hydrocollator packs  Planned therapy interventions: manual therapy, neuromuscular re-education, self care, therapeutic activities, therapeutic exercise and home exercise program  Frequency: 2x week  Duration in weeks: 12  Treatment plan discussed with: patient        Subjective Evaluation    History of Present Illness  Mechanism of injury: Gloria Medellin reports that her knees are approximately 70% improved since the start of therapy  She notes that the right knee continues to be stiff and she has to be very consistent with her biking and her stretching  She notes that the knees are doing better on the stairs up but notes the pain still varies day to day  She reports the intensity of her hip pain is decreased but she still gets pain intermittently   She notes that the pain in the hip seems to be random however she still cant sleep on the right side because of pain  MRI performed on bilateral knees "years ago" which shows small tearing bilaterally, reports she had the left knee "cleaned out years ago"  Left knee:   Current: 3/10  Best: 3/10  Worst: 8/10    Right knee:   Current: 7/10  Best: 5/10  Worst: 10/10    Right hip: sharp/stabbing   Current: 4/10  Best: 4/10  Worst: 10/10  Quality of life: good    Pain  Quality: dull ache (right knee feels like it is going to out)  Relieving factors: rest  Aggravating factors: stair climbing, walking and lifting  Progression: worsening    Social Support  Steps to enter house: yes  Stairs in house: yes   Lives in: multiple-level home  Lives with: spouse    Employment status: not working  Hand dominance: right      Diagnostic Tests  X-ray: normal  MRI studies: abnormal (years ago - meniscus tears bilaterally)  Patient Goals  Patient goals for therapy: decreased pain, improved balance, increased motion, return to work, return to [employer] activities, independence with ADLs/IADLs and increased strength  Patient goal: be able to walk 5 miles a day, relieve hip pain to improve ability pushing a shopping cart        Objective     Static Posture     Comments  In static standing pt intermittently shifts weight to the left and rests RLE in hip ER and knee flexion     Palpation     Right   Tenderness of the gluteus medius and TFL  Tenderness     Right Hip   Tenderness in the greater trochanter  Left Knee   Tenderness in the patellar tendon  Right Knee   Tenderness in the patellar tendon       Additional Tenderness Details  TTP along glute med and glute max on the right     Neurological Testing     Sensation     Lumbar   Left   Intact: light touch    Right   Intact: light touch    Knee   Left Knee   Intact: light touch    Right Knee   Intact: light touch     Active Range of Motion   Left Hip   Flexion: 98 degrees   External rotation (90/90): 25 degrees   Internal rotation (90/90): 26 degrees     Right Hip   Flexion: 90 degrees   External rotation (90/90): 28 degrees   Internal rotation (90/90): 25 degrees   Left Knee   Hyperextension  Flexion: 115 degrees   Extension: -2 degrees     Right Knee   Flexion: 115 degrees   Extension: 1 (lacking 1 degree of ext ) degrees     Additional Active Range of Motion Details  Flexion: to knee   Extension: 80%  SL L: WFL  SB R: WFL      Mobility   Patellar Mobility:   Left Knee   WFL: medial and lateral    Hypomobile: left superior and left inferior    Right Knee   WFL: medial and lateral  Hypomobile: superior and inferior   Mechanical Assessment    Cervical      Thoracic      Lumbar    Standing flexion: repeated movements   Pain intensity: better  Pain level: decreased  Standing extension: repeated movements  Pain intensity: worse  Pain level: increased    Strength/Myotome Testing     Left Hip   Planes of Motion   Flexion: 4+  Extension: 4  Abduction: 4  External rotation: 4-  Internal rotation: 4    Right Hip   Planes of Motion   Flexion: 4+  Extension: 4-  Abduction: 4-  External rotation: 4-  Internal rotation: 4-    Left Knee   Flexion: 4+  Extension: 4+    Right Knee   Flexion: 4  Extension: 4+    Additional Strength Details  Pain on R with MMT     Tests     Left Hip   Positive piriformis  Negative BENNETT, FADIR and scour  Right Hip   Positive piriformis  Negative BENNETT and FADIR  Left Knee   Negative anterior drawer, lateral Tawana, medial Tawana, valgus stress test at 0 degrees, valgus stress test at 30 degrees, varus stress test at 0 degrees and varus stress test at 30 degrees  Right Knee   Positive lateral Tawana and medial Tawana  Negative anterior drawer, valgus stress test at 0 degrees, valgus stress test at 30 degrees, varus stress test at 0 degrees and varus stress test at 30 degrees       Additional Tests Details  + slump on R     Functional Assessment      Squat    Left within functional limits and right within functional limits  Forward Step Up 8"   Left Leg  Within functional limits  No pain  Right Leg  No pain and no valgus  Forward Step Down 8"   Left Leg  Pain and valgus  Right Leg  Pain and valgus  Single Leg Stance - Eyes Open   Left  Trial 1: 16 seconds    Right  Trial 1: 12 seconds  Comments: Right hip and knee pain reported         Comments  STS: able to perform with equal WB, no UE use     General Comments:      Lumbar Comments  Further assessment of lumbar spine is advised, limited due to time constraints              Precautions: standard   HEP created through [address] [place] [address], access code: [facility]   IE: 7/6  Re-eval 8/3     9 re-eval  8 7 6   Manuals 8/8 8/1 7/27 7/25   Mobs  Extension mobs grade III  Inf patella B  Med pat glide on R during squats decreased pain  End-range ext B End-range ext mobs on R 4 min End-range ext mobs on R 4 min    Contract relax   HS x 4 min R                   Neuro Re-Ed       Skaters        Step ups  10x R 8" Step ups 4" 10x B  6" 20x B Step up with march 6" 10x ea     Step downs        Hip drops standing     2x10 belinda     Seated on/off 6" step w/quad set + SLR  10x B cues to avoid hip ER     Quad sets  15x5s B            Ther Ex       Piriformis stretch        Mod chaparrita stretch       Hamstring stretch   2x30s B Seated 2x30s ea  3x30s    SLR  ABD 2x10   Standing hip ABD 2x10  abd 2x10 belinda    Bike  5 min  6 min 5 min  5 min    Supine hip ER/IR        Clamshell  2x10 R    2x10 belinda   Heel slide with quad set        gastroc stretch    3x30s belinda  3x30s    STS    10x with airex  10x with airex   10x no airex    Mini squat  20x      bridge    3x10 green    Backward walk at [employer] TKE 4 0 20x5s hold   3 6 5 laps            Modalities                         Insurance:  AMA/CMS Eval/ Re-eval Auth #/ Referral # Visit limitation? PT only or  PT+OT?  Co-Insurance   [facility] 7/6 No auth  BOMN  $30 Co-pay

## 2022-08-08 NOTE — LETTER
2022    Neha Bahena MD  [address] [place] [address]    Patient: Mercy Carmichael   YOB: 1960   Date of Visit: 2022     Encounter Diagnosis     ICD-10-CM    1  Pain in both knees, unspecified chronicity  M25 561     M25 562    2  Right hip pain  M25 551    3  Gait abnormality  R26 9        Dear Dr Bahena: Thank you for your recent referral of Mercy Carmichael  Please review the attached evaluation summary from Gloria's recent visit  Please verify that you agree with the plan of care by signing the attached order  If you have any questions or concerns, please do not hesitate to call  I sincerely appreciate the opportunity to share in the care of one of your patients and hope to have another opportunity to work with you in the near future  Sincerely,    Do Ayala PT      Referring Provider:      I certify that I have read the below Plan of Care and certify the need for these services furnished under this plan of treatment while under my care  Neha Bahena MD  [address] [place] [address]  Via Fax: [phone]          PT Re-Evaluation     Today's date: 2022  Patient name: Mercy Carmichael  : 1960  MRN: [id number]  Referring provider: Neha Bahena MD  Dx:   Encounter Diagnosis     ICD-10-CM    1  Pain in both knees, unspecified chronicity  M25 561     M25 562    2  Right hip pain  M25 551    3  Gait abnormality  R26 9                   Assessment  Assessment details: Mercy Carmichael has been seen for 8 visits of OPPT in regards to bilateral knee pain and right hip pain  She demonstrates gains in pain free knee flexion motion, however continued extension deficits on the right  Slow gains in patellar mobility noted and pt is proficient in self patellar mobs  She demonstrates functional gains in strength as demonstrated by improved tolerance for transfers and ascending stairs reciprocally    Eccentric weakness impacts decending stairs  Hip ABD and external rotation weakness still noted which contributes to pain with ambulation  she would benefit from continued skilled OPPT to address remaining ROM restrictions, strength deficits, and improve pts tolerance for transfers, extended ambulation, don/dof shoes, dressing and regular exercise program      Patient has been educated in home exercise program and plan of care  Patient would benefit from skilled physical therapy services to address their aforementioned functional limitations and progress towards prior level of function and independence with home exercise program    Impairments: abnormal gait, abnormal or restricted ROM, activity intolerance, impaired balance, impaired physical strength, lacks appropriate home exercise program, pain with function, weight-bearing intolerance and poor body mechanics  Understanding of Dx/Px/POC: good   Prognosis: good    Goals  ST  Improve right hip ROM so that pt will be able to don/doff shoes without pain within 2-4 weeks  - MET  2  Improve right hip strength by 1/2 grade in all deficient planes within 2-4 weeks  - progressing   3  Improve right hip and strength so that pt is able to ambulate with normal gait pattern for 2 miles within 2-4 weeks  - MET  4  Pt will be independent with HEP - MET  5  Pt will demonstrate improved SLS to 30s EO to improve her tolerance for walking 1 mile on uneven track     LT  Improve right hip strength so that pt is able to negotiate stairs reciprocally without pain within 4-8 weeks - progressing   2  Improve SLS balance with eyes closed to greater than 15 sec within 4-8 weeks  3  Improve right hip strength to be able to squat and lift >20lbs without discomfort within 4-8 weeks  4  Pt will be independent with HEP upon discharge   5   Pt will be able to perform SLS at least 15s EC to allow her to walk unrestricted on uneven surfaces     Plan  Plan details: HEP development, stretching, strengthening, A/AA/PROM, joint mobilizations, posture education, STM/MI as needed to reduce muscle tension, balance and proprioceptive training, muscle reeducation, PLOC discussed and agreed upon with patient  Patient would benefit from: PT eval and skilled physical therapy  Planned modality interventions: cryotherapy and thermotherapy: hydrocollator packs  Planned therapy interventions: manual therapy, neuromuscular re-education, self care, therapeutic activities, therapeutic exercise and home exercise program  Frequency: 2x week  Duration in weeks: 12  Treatment plan discussed with: patient        Subjective Evaluation    History of Present Illness  Mechanism of injury: Tariq Duncan reports that her knees are approximately 70% improved since the start of therapy  She notes that the right knee continues to be stiff and she has to be very consistent with her biking and her stretching  She notes that the knees are doing better on the stairs up but notes the pain still varies day to day  She reports the intensity of her hip pain is decreased but she still gets pain intermittently  She notes that the pain in the hip seems to be random however she still cant sleep on the right side because of pain  MRI performed on bilateral knees "years ago" which shows small tearing bilaterally, reports she had the left knee "cleaned out years ago"      Left knee:   Current: 3/10  Best: 3/10  Worst: 8/10    Right knee:   Current: 7/10  Best: 5/10  Worst: 10/10    Right hip: sharp/stabbing   Current: 4/10  Best: 4/10  Worst: 10/10  Quality of life: good    Pain  Quality: dull ache (right knee feels like it is going to out)  Relieving factors: rest  Aggravating factors: stair climbing, walking and lifting  Progression: worsening    Social Support  Steps to enter house: yes  Stairs in house: yes   Lives in: multiple-level home  Lives with: spouse    Employment status: not working  Hand dominance: right      Diagnostic Tests  X-ray: normal  MRI studies: abnormal (years ago - meniscus tears bilaterally)  Patient Goals  Patient goals for therapy: decreased pain, improved balance, increased motion, return to work, return to [employer] activities, independence with ADLs/IADLs and increased strength  Patient goal: be able to walk 5 miles a day, relieve hip pain to improve ability pushing a shopping cart        Objective     Static Posture     Comments  In static standing pt intermittently shifts weight to the left and rests RLE in hip ER and knee flexion     Palpation     Right   Tenderness of the gluteus medius and TFL  Tenderness     Right Hip   Tenderness in the greater trochanter  Left Knee   Tenderness in the patellar tendon  Right Knee   Tenderness in the patellar tendon       Additional Tenderness Details  TTP along glute med and glute max on the right     Neurological Testing     Sensation     Lumbar   Left   Intact: light touch    Right   Intact: light touch    Knee   Left Knee   Intact: light touch    Right Knee   Intact: light touch     Active Range of Motion   Left Hip   Flexion: 98 degrees   External rotation (90/90): 25 degrees   Internal rotation (90/90): 26 degrees     Right Hip   Flexion: 90 degrees   External rotation (90/90): 28 degrees   Internal rotation (90/90): 25 degrees   Left Knee   Hyperextension  Flexion: 115 degrees   Extension: -2 degrees     Right Knee   Flexion: 115 degrees   Extension: 1 (lacking 1 degree of ext ) degrees     Additional Active Range of Motion Details  Flexion: to knee   Extension: 80%  SL L: WFL  SB R: WFL      Mobility   Patellar Mobility:   Left Knee   WFL: medial and lateral    Hypomobile: left superior and left inferior    Right Knee   WFL: medial and lateral  Hypomobile: superior and inferior   Mechanical Assessment    Cervical      Thoracic      Lumbar    Standing flexion: repeated movements   Pain intensity: better  Pain level: decreased  Standing extension: repeated movements  Pain intensity: worse  Pain level: increased    Strength/Myotome Testing     Left Hip   Planes of Motion   Flexion: 4+  Extension: 4  Abduction: 4  External rotation: 4-  Internal rotation: 4    Right Hip   Planes of Motion   Flexion: 4+  Extension: 4-  Abduction: 4-  External rotation: 4-  Internal rotation: 4-    Left Knee   Flexion: 4+  Extension: 4+    Right Knee   Flexion: 4  Extension: 4+    Additional Strength Details  Pain on R with MMT     Tests     Left Hip   Positive piriformis  Negative BENNETT, FADIR and scour  Right Hip   Positive piriformis  Negative BENNETT and FADIR  Left Knee   Negative anterior drawer, lateral Tawana, medial Tawana, valgus stress test at 0 degrees, valgus stress test at 30 degrees, varus stress test at 0 degrees and varus stress test at 30 degrees  Right Knee   Positive lateral Tawana and medial Tawana  Negative anterior drawer, valgus stress test at 0 degrees, valgus stress test at 30 degrees, varus stress test at 0 degrees and varus stress test at 30 degrees  Additional Tests Details  + slump on R     Functional Assessment      Squat    Left within functional limits and right within functional limits  Forward Step Up 8"   Left Leg  Within functional limits  No pain  Right Leg  No pain and no valgus  Forward Step Down 8"   Left Leg  Pain and valgus  Right Leg  Pain and valgus       Single Leg Stance - Eyes Open   Left  Trial 1: 16 seconds    Right  Trial 1: 12 seconds  Comments: Right hip and knee pain reported         Comments  STS: able to perform with equal WB, no UE use     General Comments:      Lumbar Comments  Further assessment of lumbar spine is advised, limited due to time constraints              Precautions: standard   HEP created through [address] [place] [address], access code: [email or web address]   IE: 7/6  Re-eval 8/3     9 re-eval  8 7 6   Manuals 8/8 8/1 7/27 7/25   Mobs  Extension mobs grade III  Inf patella B  Med pat glide on R during squats decreased pain  End-range ext B End-range ext mobs on R 4 min End-range ext mobs on R 4 min    Contract relax   HS x 4 min R                   Neuro Re-Ed       Skaters        Step ups  10x R 8" Step ups 4" 10x B  6" 20x B Step up with march 6" 10x ea     Step downs        Hip drops standing     2x10 belinda     Seated on/off 6" step w/quad set + SLR  10x B cues to avoid hip ER     Quad sets  15x5s B            Ther Ex       Piriformis stretch        Mod chaparrita stretch       Hamstring stretch   2x30s B Seated 2x30s ea  3x30s    SLR  ABD 2x10   Standing hip ABD 2x10  abd 2x10 belinda    Bike  5 min  6 min 5 min  5 min    Supine hip ER/IR        Clamshell  2x10 R    2x10 belinda   Heel slide with quad set        gastroc stretch    3x30s belinda  3x30s    STS    10x with airex  10x with airex   10x no airex    Mini squat  20x      bridge    3x10 green    Backward walk at [employer] TKE 4 0 20x5s hold   3 6 5 laps            Modalities                         Insurance:  AMA/CMS Eval/ Re-eval Auth #/ Referral # Visit limitation? PT only or  PT+OT?  Co-Insurance   [facility] 7/6 No auth  BOMN  $30 Co-pay

## 2022-08-10 ENCOUNTER — OFFICE VISIT (OUTPATIENT)
Dept: PHYSICAL THERAPY | Facility: CLINIC | Age: 62
End: 2022-08-10
Payer: COMMERCIAL

## 2022-08-10 DIAGNOSIS — M25.561 PAIN IN BOTH KNEES, UNSPECIFIED CHRONICITY: Primary | ICD-10-CM

## 2022-08-10 DIAGNOSIS — M25.562 PAIN IN BOTH KNEES, UNSPECIFIED CHRONICITY: Primary | ICD-10-CM

## 2022-08-10 DIAGNOSIS — M25.551 RIGHT HIP PAIN: ICD-10-CM

## 2022-08-10 DIAGNOSIS — R26.9 GAIT ABNORMALITY: ICD-10-CM

## 2022-08-10 PROCEDURE — 97110 THERAPEUTIC EXERCISES: CPT

## 2022-08-10 PROCEDURE — 97112 NEUROMUSCULAR REEDUCATION: CPT

## 2022-08-10 NOTE — PROGRESS NOTES
Daily Note     Today's date: 8/10/2022  Patient name: Trinh Robertson  : 1960  MRN: [id number]  Referring provider: Craig Bahena MD  Dx:   Encounter Diagnosis     ICD-10-CM    1  Pain in both knees, unspecified chronicity  M25 561     M25 562    2  Right hip pain  M25 551    3  Gait abnormality  R26 9                   Subjective: Trinh Robertson reports her hip feels better today than last session  She notes that she did some knee extensions at home which seemed to help with keeping the right knee straight  Objective: See treatment diary below      Assessment: Tolerated treatment well  No hip pain with isometric hip ABD, good challenge noted  Addition of resisted side stepping to further address hip abduction strength  Patient demonstrated fatigue post treatment, exhibited good technique with therapeutic exercises and would benefit from continued PT to progress terminal knee extension on the right, progress hip strength, improve tolerance for extended ambulation, improve tolerance for laying and sleeping without hip pain, and facilitate return to PLOF  Plan: Continue per plan of care  Progress treatment as tolerated         Precautions: standard   HEP created through [address] [place] [address], access code: [email or web address]   IE:   Re-eval 8/3     10 9 re-eval  8 7 6   Manuals 8/10 8/8 8/1 7/27 7/25   Mobs  Extension mobs grade III  Extension mobs grade III  Inf patella B  Med pat glide on R during squats decreased pain  End-range ext B End-range ext mobs on R 4 min End-range ext mobs on R 4 min    Contract relax    HS x 4 min R                     Neuro Re-Ed        Skaters         Step ups  2x10 6" belinda  10x R 8" Step ups 4" 10x B  6" 20x B Step up with " 10x ea     Step downs         Hip drops standing      2x10 belinda     Seated on/off 6" step w/quad set + SLR   10x B cues to avoid hip ER     Quad sets   15x5s B     Tandem balance  2x30s ea on airex        Hip ABD isometric into step 15x5s hold Ther Ex        Piriformis stretch         Mod chaparrita stretch        Hamstring stretch  3x30s R   2x30s B Seated 2x30s ea  3x30s    SLR  abd 2x10 ea  ABD 2x10   Standing hip ABD 2x10  abd 2x10 belinda    Bike  5 min  5 min  6 min 5 min  5 min    Supine hip ER/IR         Clamshell   2x10 R    2x10 belinda   Heel slide with quad set         gastroc stretch     3x30s belinda  3x30s    STS     10x with airex  10x with airex   10x no airex    Mini squat   20x      bridge     3x10 green    [facility]  TKE 4 0 20x5s hold   3 6 5 laps     Side stepping  david 3 3 10 laps ea        Leg press  75lbs 3x10                            Insurance:  AMA/LE [place]/ Leo Azul #/ Referral # Visit limitation? PT only or  PT+OT?  Co-Insurance   [facility] 7/6 No auth  BOMN  $30 Co-pay

## 2022-08-15 ENCOUNTER — OFFICE VISIT (OUTPATIENT)
Dept: PHYSICAL THERAPY | Facility: CLINIC | Age: 62
End: 2022-08-15
Payer: COMMERCIAL

## 2022-08-15 DIAGNOSIS — M25.561 PAIN IN BOTH KNEES, UNSPECIFIED CHRONICITY: Primary | ICD-10-CM

## 2022-08-15 DIAGNOSIS — M25.551 RIGHT HIP PAIN: ICD-10-CM

## 2022-08-15 DIAGNOSIS — M25.562 PAIN IN BOTH KNEES, UNSPECIFIED CHRONICITY: Primary | ICD-10-CM

## 2022-08-15 DIAGNOSIS — R26.9 GAIT ABNORMALITY: ICD-10-CM

## 2022-08-15 PROCEDURE — 97110 THERAPEUTIC EXERCISES: CPT

## 2022-08-15 PROCEDURE — 97112 NEUROMUSCULAR REEDUCATION: CPT

## 2022-08-15 NOTE — PROGRESS NOTES
Daily Note     Today's date: 8/15/2022  Patient name: Raj Dominguez  : 1960  MRN: [id number]  Referring provider: Kishor Bahena MD  Dx:   Encounter Diagnosis     ICD-10-CM    1  Pain in both knees, unspecified chronicity  M25 561     M25 562    2  Right hip pain  M25 551    3  Gait abnormality  R26 9                   Subjective: Raj Dominguez reports the hip was a little irritated after sitting for 3 hours at the [employer]  She notes that the right knee doesn't feel as stiff as usual and that its going straighter  Objective: See treatment diary below      Assessment: Tolerated treatment well  She was able to tolerate SLS clocks as well as lunge onto bosu to address balance and proprioception, fatigue noted  Good stability at hip and knee with lunges on bosu with few verbal cues required  Patient demonstrated fatigue post treatment, exhibited good technique with therapeutic exercises and would benefit from continued PT to progress end-range extension mobility, strength of hip abductors and external rotators, dynamic control and endurance  Plan: Continue per plan of care  Progress treatment as tolerated         Precautions: standard   HEP created through [address] [place] [address], access code: [email or web address]   IE:   Re-eval 8/3     11 10 9 re-eval    Manuals 8/15 8/10 8   Mobs  Extension mobs grade III/IV Extension mobs grade III  Extension mobs grade III    Contract relax                  Neuro Re-Ed      Fwd lunge onto bosu 15x belinda      Step ups  2x10 6" belinda  2x10 6" belinda  10x R 8"   Step downs       Hip drops standing       Seated on/off 6" step w/quad set + SLR      SLS clocks  3x3 belinda      Tandem balance   2x30s ea on airex     Hip ABD isometric into step 10x5s hold ea  15x5s hold     Ther Ex      Piriformis stretch       Mod chaparrita stretch      Hamstring stretch   3x30s R     SLR   abd 2x10 ea  ABD 2x10    Bike  5 min  5 min  5 min    Supine hip ER/IR       Clamshell    2x10 R    Heel slide with quad set       gastroc stretch  3x30s ea      STS       Mini squat    20x   bridge      Backward walk at [employer]   TKE 4 0 20x5s hold    Side stepping   david 3 3 10 laps ea     Leg press   75lbs 3x10                     Insurance:  AMA/LE [place]/ Lc Vieyra #/ Referral # Visit limitation? PT only or  PT+OT?  Co-Insurance   [facility] 7/6 No auth  BOMN  $30 Co-pay

## 2022-08-17 ENCOUNTER — OFFICE VISIT (OUTPATIENT)
Dept: PHYSICAL THERAPY | Facility: CLINIC | Age: 62
End: 2022-08-17
Payer: COMMERCIAL

## 2022-08-17 DIAGNOSIS — M25.551 RIGHT HIP PAIN: ICD-10-CM

## 2022-08-17 DIAGNOSIS — M25.562 PAIN IN BOTH KNEES, UNSPECIFIED CHRONICITY: Primary | ICD-10-CM

## 2022-08-17 DIAGNOSIS — R26.9 GAIT ABNORMALITY: ICD-10-CM

## 2022-08-17 DIAGNOSIS — M25.561 PAIN IN BOTH KNEES, UNSPECIFIED CHRONICITY: Primary | ICD-10-CM

## 2022-08-17 PROCEDURE — 97140 MANUAL THERAPY 1/> REGIONS: CPT

## 2022-08-17 PROCEDURE — 97112 NEUROMUSCULAR REEDUCATION: CPT

## 2022-08-17 PROCEDURE — 97110 THERAPEUTIC EXERCISES: CPT

## 2022-08-17 NOTE — PROGRESS NOTES
Daily Note     Today's date: 2022  Patient name: Evelyne Mosley  : 1960  MRN: [id number]  Referring provider: Tip Bahena MD  Dx:   Encounter Diagnosis     ICD-10-CM    1  Pain in both knees, unspecified chronicity  M25 561     M25 562    2  Right hip pain  M25 551    3  Gait abnormality  R26 9                   Subjective: Evelyne Mosley reports he was a little sore after last session but denies increase in pain  She notes she walked some hills on her walk yesterday and noticed a little irritation in the hip but no increased pain  Objective: See treatment diary below      Assessment: Tolerated treatment well  Pt able to tolerate addition of wobble board to promote improved proprioception  Good form with all therex without knee valgus noted  Patient demonstrated fatigue post treatment, exhibited good technique with therapeutic exercises and would benefit from continued PT to progress strengthening, endurance and improve tolerance for ADLs  Plan: Continue per plan of care        Precautions: standard   HEP created through [address] [place] [address], access code: [facility]   IE:   Re-eval 8/3     12 11 10 9 re-eval    Manuals 8/17 8/15 8/10 8   Mobs  Extension mobs grade III/IV Extension mobs grade III/IV Extension mobs grade III  Extension mobs grade III    Contract relax HS stretch 2 min                     Neuro Re-Ed       Fwd lunge onto bosu 15x ea  15x belinda      Step ups  6" + airex 20x ea 2x10 6" belinda  2x10 6" belinda  10x R 8"   Step downs        Hip drops standing        Seated on/off 6" step w/quad set + SLR       SLS clocks   3x3 belinda      Tandem balance    2x30s ea on airex     Hip ABD isometric into step 10x5s hold ea  10x5s hold ea  15x5s hold     Wobble board  2 min ML/AP ea      Ther Ex       Piriformis stretch        Mod chaparrita stretch       Hamstring stretch    3x30s R     SLR    abd 2x10 ea  ABD 2x10    Bike   5 min  5 min  5 min    Supine hip ER/IR        Clamshell     2x10 R Heel slide with quad set        gastroc stretch   3x30s ea      STS        Mini squat     20x   bridge       Backward walk at [employer]    TKE 4 0 20x5s hold    Side stepping  4 laps at [employer] 3 3 10 laps ea     Leg press    75lbs 3x10     Standing hip abd/ext 20x ea belinda                 Insurance:  AMA/CMS Eval/ Camila Began #/ Referral # Visit limitation? PT only or  PT+OT?  Co-Insurance   [facility] 7/6 No auth  BOMN  $30 Co-pay

## 2022-08-22 ENCOUNTER — APPOINTMENT (OUTPATIENT)
Dept: PHYSICAL THERAPY | Facility: CLINIC | Age: 62
End: 2022-08-22
Payer: COMMERCIAL

## 2022-08-22 DIAGNOSIS — M25.561 PAIN IN BOTH KNEES, UNSPECIFIED CHRONICITY: Primary | ICD-10-CM

## 2022-08-22 DIAGNOSIS — R26.9 GAIT ABNORMALITY: ICD-10-CM

## 2022-08-22 DIAGNOSIS — M25.562 PAIN IN BOTH KNEES, UNSPECIFIED CHRONICITY: Primary | ICD-10-CM

## 2022-08-22 DIAGNOSIS — M25.551 RIGHT HIP PAIN: ICD-10-CM

## 2022-08-22 NOTE — PROGRESS NOTES
Daily Note     Today's date: 2022  Patient name: Rosa M Brown  : 1960  MRN: [id number]  Referring provider: Jaison Bahena Ma, MD  Dx:   Encounter Diagnosis     ICD-10-CM    1  Pain in both knees, unspecified chronicity  M25 561     M25 562    2  Right hip pain  M25 551    3  Gait abnormality  R26 9                   Subjective: ***      Objective: See treatment diary below      Assessment: Tolerated treatment well  Patient demonstrated fatigue post treatment, exhibited good technique with therapeutic exercises and would benefit from continued PT to progress hip strength, LE endurance, terminal knee extension range of motion, and facilitate appropriate return to walking and exercise program        Plan: Continue per plan of care        Precautions: standard   HEP created through [address] [place] [address], access code: [facility]   IE:   Re-eval      13 12 11 10   Manuals 8/22 8/17 8/15 8/10   Mobs   Extension mobs grade III/IV Extension mobs grade III/IV Extension mobs grade III    Contract relax  HS stretch 2 min                    Neuro Re-Ed       Fwd lunge onto bosu  15x ea  15x belinda     Step ups   6" + airex 20x ea 2x10 6" belinda  2x10 6" belinda    Step downs        Hip drops standing        Seated on/off 6" step w/quad set + SLR       SLS clocks    3x3 belinda     Tandem balance     2x30s ea on airex    Hip ABD isometric into step  10x5s hold ea  10x5s hold ea  15x5s hold    Wobble board   2 min ML/AP ea     Ther Ex       Piriformis stretch        Mod chaparrita stretch       Hamstring stretch     3x30s R    SLR     abd 2x10 ea    Bike    5 min  5 min    Supine hip ER/IR        Clamshell        Heel slide with quad set        gastroc stretch    3x30s ea     STS        Mini squat        bridge       Backward walk at [employer]       Side stepping   4 laps at [employer] 3 3 10 laps ea    Leg press     75lbs 3x10    Standing hip abd/ext  20x ea belinda                Insurance:  AMA/CMS Eval/ Avelina Javier #/ Referral # Visit limitation? PT only or  PT+OT?  Co-Insurance   [facility] 7/6 No auth  BOMN  $30 Co-pay

## 2022-08-24 ENCOUNTER — OFFICE VISIT (OUTPATIENT)
Dept: PHYSICAL THERAPY | Facility: CLINIC | Age: 62
End: 2022-08-24
Payer: COMMERCIAL

## 2022-08-24 DIAGNOSIS — R26.9 GAIT ABNORMALITY: ICD-10-CM

## 2022-08-24 DIAGNOSIS — M25.561 PAIN IN BOTH KNEES, UNSPECIFIED CHRONICITY: Primary | ICD-10-CM

## 2022-08-24 DIAGNOSIS — M25.562 PAIN IN BOTH KNEES, UNSPECIFIED CHRONICITY: Primary | ICD-10-CM

## 2022-08-24 DIAGNOSIS — M25.551 RIGHT HIP PAIN: ICD-10-CM

## 2022-08-24 PROCEDURE — 97112 NEUROMUSCULAR REEDUCATION: CPT

## 2022-08-24 PROCEDURE — 97140 MANUAL THERAPY 1/> REGIONS: CPT

## 2022-08-24 PROCEDURE — 97110 THERAPEUTIC EXERCISES: CPT

## 2022-08-24 NOTE — PROGRESS NOTES
Daily Note     Today's date: 2022  Patient name: Monique Galvan  : 1960  MRN: [id number]  Referring provider: Giselle Bahena MD  Dx:   Encounter Diagnosis     ICD-10-CM    1  Pain in both knees, unspecified chronicity  M25 561     M25 562    2  Right hip pain  M25 551    3  Gait abnormality  R26 9                   Subjective: Monique Galvan reports that she had increased pain for about 3 days after last session in the knees  Denies pain in the hip  She notes she took it easy and did her exercises  She notes today she feels good  She reports a little bit of tightness in the right knee primarily at night before bed  Objective: See treatment diary below      Assessment: Tolerated treatment well  TTP at right popliteus with mild hypomobility noted at prox fibula, addressed with STM and mobilizations  good tolerance for weights with sit to stands as well as airex with step ups  Patient demonstrated fatigue post treatment, exhibited good technique with therapeutic exercises and would benefit from continued PT to progress hip strength, LE endurance, terminal knee extension range of motion, and facilitate appropriate return to walking and exercise program        Plan: Continue per plan of care        Precautions: standard   HEP created through [address] [place] [address], access code: [facility]   IE:   Re-eval      13 12 11 10   Manuals 8/24 8/17 8/15 8/10   Mobs  Extension mobs grade III/IV R + prox fib head grade III Extension mobs grade III/IV Extension mobs grade III/IV Extension mobs grade III    Contract relax  HS stretch 2 min      STM Right popliteus              Neuro Re-Ed       Fwd lunge onto bosu  15x ea  15x belinda     Step ups  6"+ airex 15x ea  6" + airex 20x ea 2x10 6" belinda  2x10 6" belinda    Step downs        Hip drops standing        Seated on/off 6" step w/quad set + SLR       SLS clocks    3x3 belinda     Tandem balance     2x30s ea on airex    Hip ABD isometric into step  10x5s hold ea  10x5s hold ea  15x5s hold    Wobble board   2 min ML/AP ea     Ther Ex       Piriformis stretch        Mod chaparrita stretch       Hamstring stretch     3x30s R    SLR     abd 2x10 ea    Bike  5 min upright lvl 3  5 min  5 min    Supine hip ER/IR        Clamshell        Heel slide with quad set        gastroc stretch    3x30s ea     STS  10lbs 3x10       Mini squat        bridge       Backward walk at [employer]       Side stepping   4 laps at [employer] 3 3 10 laps ea    Leg press     75lbs 3x10    Standing hip abd/ext david 2 5 20x ea belinda  20x ea belinda                Insurance:  AMA/CMS Eval/ Marco Antonio Robertson #/ Referral # Visit limitation? PT only or  PT+OT?  Co-Insurance   [facility] 7/6 No auth  BOMN  $30 Co-pay

## 2022-08-29 ENCOUNTER — OFFICE VISIT (OUTPATIENT)
Dept: PHYSICAL THERAPY | Facility: CLINIC | Age: 62
End: 2022-08-29
Payer: COMMERCIAL

## 2022-08-29 DIAGNOSIS — M25.551 RIGHT HIP PAIN: ICD-10-CM

## 2022-08-29 DIAGNOSIS — M25.561 PAIN IN BOTH KNEES, UNSPECIFIED CHRONICITY: Primary | ICD-10-CM

## 2022-08-29 DIAGNOSIS — R26.9 GAIT ABNORMALITY: ICD-10-CM

## 2022-08-29 DIAGNOSIS — M25.562 PAIN IN BOTH KNEES, UNSPECIFIED CHRONICITY: Primary | ICD-10-CM

## 2022-08-29 PROCEDURE — 97112 NEUROMUSCULAR REEDUCATION: CPT

## 2022-08-29 PROCEDURE — 97140 MANUAL THERAPY 1/> REGIONS: CPT

## 2022-08-29 PROCEDURE — 97110 THERAPEUTIC EXERCISES: CPT

## 2022-08-29 NOTE — PROGRESS NOTES
Daily Note     Today's date: 2022  Patient name: Darrell Marvin  : 1960  MRN: [id number]  Referring provider: More, Serene Spatz, MD  Dx:   Encounter Diagnosis     ICD-10-CM    1  Pain in both knees, unspecified chronicity  M25 561     M25 562    2  Right hip pain  M25 551    3  Gait abnormality  R26 9                   Subjective: Darrell Marvin reports that she felt good after last session, noting she went grocery shopping afterward and then went for a walk  She notes that she started to get hip pain in the hip on her walk which hasnt calmed down  Objective: See treatment diary below      Assessment: Tolerated treatment well  TTP noted along glute med, TFL and along ITB, addressed with STM and roller  Ed on use of roller gently at home  Focused on low level activation of glutes in NWB and WB with emphasis on form, fatigue noted Patient demonstrated fatigue post treatment, exhibited good technique with therapeutic exercises and would benefit from continued PT to progress strength, endurance, coordination and tolerance for higher level activities  Plan: Continue per plan of care  Progress treatment as tolerated         Precautions: standard   HEP created through [address] [place] [address], access code: [facility]   IE:   Re-eval      14 13 12 11   Manuals 8/29 8/24 8/17 8/15   Mobs   Extension mobs grade III/IV R + prox fib head grade III Extension mobs grade III/IV Extension mobs grade III/IV   Contract relax   HS stretch 2 min     STM Right glute, along ITB Right popliteus             Neuro Re-Ed       Fwd lunge onto bosu   15x ea  15x belinda    Step ups   6"+ airex 15x ea  6" + airex 20x ea 2x10 6" belinda    Step downs        Hip drops standing        Seated on/off 6" step w/quad set + SLR       SLS clocks  SLS hold 4x10s   3x3 belinda    Tandem balance        Hip ABD isometric into step   10x5s hold ea  10x5s hold ea    Skater  ML/AP 20x5s  ea      Wobble board    2 min ML/AP ea    Ther Ex Piriformis stretch        Mod chaparrita stretch       Hamstring stretch        SLR        Bike   5 min upright lvl 3  5 min    Supine hip ER/IR        Clamshell  2x10      Heel slide with quad set        gastroc stretch     3x30s ea    STS   10lbs 3x10      Mini squat        bridge 2x10       Backward walk at [employer]       Side stepping    4 laps at mirror     Leg press        Standing hip abd/ext  david 2 5 20x ea belinda  20x ea belinda               Insurance:  AMA/LE Stubbs/ Jorge L Nails #/ Referral # Visit limitation? PT only or  PT+OT?  Co-Insurance   [facility] 7/6 No auth  BOMN  $30 Co-pay

## 2022-08-31 ENCOUNTER — OFFICE VISIT (OUTPATIENT)
Dept: PHYSICAL THERAPY | Facility: CLINIC | Age: 62
End: 2022-08-31
Payer: COMMERCIAL

## 2022-08-31 DIAGNOSIS — M25.561 PAIN IN BOTH KNEES, UNSPECIFIED CHRONICITY: Primary | ICD-10-CM

## 2022-08-31 DIAGNOSIS — M25.562 PAIN IN BOTH KNEES, UNSPECIFIED CHRONICITY: Primary | ICD-10-CM

## 2022-08-31 DIAGNOSIS — M25.551 RIGHT HIP PAIN: ICD-10-CM

## 2022-08-31 DIAGNOSIS — R26.9 GAIT ABNORMALITY: ICD-10-CM

## 2022-08-31 PROCEDURE — 97112 NEUROMUSCULAR REEDUCATION: CPT

## 2022-08-31 PROCEDURE — 97110 THERAPEUTIC EXERCISES: CPT

## 2022-08-31 NOTE — PROGRESS NOTES
Daily Note     Today's date: 2022  Patient name: Ghulam Balderas  : 1960  MRN: [id number]  Referring provider: Martir Bahena MD  Dx:   Encounter Diagnosis     ICD-10-CM    1  Pain in both knees, unspecified chronicity  M25 561     M25 562    2  Right hip pain  M25 551    3  Gait abnormality  R26 9                   Subjective: Ghulam Balderas reports the hip has been better overall and she has been working on her exercises  She notes that overall she is feeling more confident in her HEP  She notes she is going on vacation next week which will be a trial of how she feels prior to potentially discharges  Objective: See treatment diary below      Assessment: Tolerated treatment well  She demonstrates good tolerance for strengthening  Reviewed form with split squats to address her ability to get to and from the floor, good form but limited depth  Patient demonstrated fatigue post treatment, exhibited good technique with therapeutic exercises and would benefit from continued PT to progress strength, endurance and balance  Plan: Continue per plan of care  Progress treatment as tolerated         Precautions: standard   HEP created through [address] [place] [address], access code: [email or web address] updated   IE:   Re-eval      15 14 13 12   Manuals    Mobs  End-range extension mobs grade III R   Extension mobs grade III/IV R + prox fib head grade III Extension mobs grade III/IV   Contract relax    HS stretch 2 min    STM  Right glute, along ITB Right popliteus            Neuro Re-Ed       Fwd lunge onto bosu Split squat 10x total    15x ea    Step ups    6"+ airex 15x ea  6" + airex 20x ea   Step downs        Hip drops standing        Seated on/off 6" step w/quad set + SLR       SLS clocks  LOS on biodex static 1x SLS hold 4x10s     Tandem balance        Hip ABD isometric into step    10x5s hold ea    Skater   ML/AP 20x5s  ea     Wobble board     2 min ML/AP ea   Ther Ex Piriformis stretch        Mod chaparrita stretch Quad stretch foot on chair 2x30s belinda       Hamstring stretch        SLR        Bike  5 min upright  5 min upright lvl 3    Supine hip ER/IR        Clamshell   2x10     Heel slide with quad set        gastroc stretch        STS    10lbs 3x10     Mini squat        bridge  2x10      Backward walk at [employer] Side stepping and backwards walking at [employer] 6 laps ea with resistance      Side stepping     4 laps at mirror    Leg press  75lbs 4x15      Standing hip abd/ext   david 2 5 20x ea belinda  20x ea belinda              Insurance:  AMA/CMS Eval/ Luis Enrique Bilis #/ Referral # Visit limitation? PT only or  PT+OT?  Co-Insurance   [facility] 7/6 No auth  BOMN  $30 Co-pay

## 2022-09-26 ENCOUNTER — EVALUATION (OUTPATIENT)
Dept: PHYSICAL THERAPY | Facility: CLINIC | Age: 62
End: 2022-09-26
Payer: COMMERCIAL

## 2022-09-26 DIAGNOSIS — M25.561 PAIN IN BOTH KNEES, UNSPECIFIED CHRONICITY: Primary | ICD-10-CM

## 2022-09-26 DIAGNOSIS — M25.562 PAIN IN BOTH KNEES, UNSPECIFIED CHRONICITY: Primary | ICD-10-CM

## 2022-09-26 DIAGNOSIS — R26.9 GAIT ABNORMALITY: ICD-10-CM

## 2022-09-26 DIAGNOSIS — M25.551 RIGHT HIP PAIN: ICD-10-CM

## 2022-09-26 PROCEDURE — 97110 THERAPEUTIC EXERCISES: CPT

## 2022-09-26 NOTE — PROGRESS NOTES
PT Discharge    Today's date: 2022  Patient name: Raj Dominguez  : 1960  MRN: [id number]  Referring provider: Kishor Bahena MD  Dx:   Encounter Diagnosis     ICD-10-CM    1  Pain in both knees, unspecified chronicity  M25 561     M25 562    2  Right hip pain  M25 551    3  Gait abnormality  R26 9                   Assessment  Assessment details: Raj Dominguez has been seen for 15 visits of OPPT regarding bilateral knee and right hip pain, however has not been seen in the clinic since  due to traveling  She presents with reduction in pain in both knees as well as the right hip  She demonstrates gains in LE strength with mild limitations in hip strength still noted  She demonstrates good understanding of HEP and was able to maintain her function with minimal pain while away  At this time she is independent with HEP and presents with no functional limitations and therefore is appropriate for discharge  Patient was given an updated HEP which was verbally reviewed with patient at end of her session today  Impairments: impaired physical strength and weight-bearing intolerance  Understanding of Dx/Px/POC: good   Prognosis: good    Goals  ST  Improve right hip ROM so that pt will be able to don/doff shoes without pain within 2-4 weeks  - MET  2  Improve right hip strength by 1/2 grade in all deficient planes within 2-4 weeks  - MET  3  Improve right hip and strength so that pt is able to ambulate with normal gait pattern for 2 miles within 2-4 weeks  - MET  4  Pt will be independent with HEP - MET  5  Pt will demonstrate improved SLS to 30s EO to improve her tolerance for walking 1 mile on uneven track - progressing     LT  Improve right hip strength so that pt is able to negotiate stairs reciprocally without pain within 4-8 weeks - MET  2  Improve SLS balance with eyes closed to greater than 15 sec within 4-8 weeks - not assessed   3    Improve right hip strength to be able to squat and lift >20lbs without discomfort within 4-8 weeks  - MET  4  Pt will be independent with HEP upon discharge - MET  5  Pt will be able to perform SLS at least 15s EC to allow her to walk unrestricted on uneven surfaces - MET    Plan  Plan details: HEP development, stretching, strengthening, A/AA/PROM, joint mobilizations, posture education, STM/MI as needed to reduce muscle tension, balance and proprioceptive training, muscle reeducation, PLOC discussed and agreed upon with patient  Patient would benefit from: PT eval and skilled physical therapy  Planned modality interventions: cryotherapy and thermotherapy: hydrocollator packs  Planned therapy interventions: manual therapy, neuromuscular re-education, self care, therapeutic activities, therapeutic exercise and home exercise program  Frequency: 2x week  Duration in weeks: 12  Treatment plan discussed with: patient        Subjective Evaluation    History of Present Illness  Mechanism of injury: Alger Bosworth reports she did well on her vacation and was able to work on her exercises  She reports that she is doing "pretty good" with the knees but the hip still "goes up and down"  She notes that she is able to manage her hip symptoms with the exercises  She notes she has started back up with the gym and she is aware of how to progress and regress the exercises  MRI performed on bilateral knees "years ago" which shows small tearing bilaterally, reports she had the left knee "cleaned out years ago"      Left knee:   Current: 0/10  Best: 0/10  Worst: 0/10    Right knee:   Current: 1/10  Best: 1/10  Worst: 5/10    Right hip: sharp/stabbing   Current: 0/10  Best: 0/10  Worst: 6/10  Quality of life: good    Pain  Quality: dull ache (right knee feels like it is going to out)  Relieving factors: rest  Aggravating factors: stair climbing, walking and lifting  Progression: worsening    Social Support  Steps to enter house: yes  Stairs in house: yes Lives in: multiple-level home  Lives with: spouse    Employment status: not working  Hand dominance: right      Diagnostic Tests  X-ray: normal  MRI studies: abnormal (years ago - meniscus tears bilaterally)  Patient Goals  Patient goals for therapy: decreased pain, improved balance, increased motion, return to work, return to [employer] activities, independence with ADLs/IADLs and increased strength  Patient goal: be able to walk 5 miles a day, relieve hip pain to improve ability pushing a shopping cart        Objective     Palpation     Right   Tenderness of the gluteus medius and TFL  Tenderness     Right Hip   No tenderness in the greater trochanter  Left Knee   No tenderness in the patellar tendon  Right Knee   No tenderness in the patellar tendon       Additional Tenderness Details  TTP along glute med and glute max on the right     Neurological Testing     Sensation     Lumbar   Left   Intact: light touch    Right   Intact: light touch    Knee   Left Knee   Intact: light touch    Right Knee   Intact: light touch     Active Range of Motion   Left Hip   Flexion: 98 degrees   External rotation (90/90): 25 degrees   Internal rotation (90/90): 26 degrees     Right Hip   Flexion: 90 degrees   External rotation (90/90): 28 degrees   Internal rotation (90/90): 25 degrees   Left Knee   Hyperextension  Flexion: 115 degrees   Extension: -2 degrees     Right Knee   Flexion: 115 degrees   Extension: 1 (lacking 1 degree of ext ) degrees     Additional Active Range of Motion Details  Flexion: to knee   Extension: 80%  SL L: WFL  SB R: WFL      Mobility   Patellar Mobility:   Left Knee   WFL: medial and lateral    Hypomobile: left superior and left inferior    Right Knee   WFL: medial and lateral  Hypomobile: superior and inferior   Mechanical Assessment    Cervical      Thoracic      Lumbar    Standing flexion: repeated movements   Pain intensity: better  Pain level: decreased  Standing extension: repeated movements  Pain intensity: worse  Pain level: increased    Strength/Myotome Testing     Left Hip   Planes of Motion   Flexion: 4+  Extension: 4  Abduction: 4  External rotation: 4  Internal rotation: 4+    Right Hip   Planes of Motion   Flexion: 4+  Extension: 4  Abduction: 4  External rotation: 4  Internal rotation: 4+    Left Knee   Flexion: 4+  Extension: 4+    Right Knee   Flexion: 4  Extension: 4+    Additional Strength Details  Pain on R with MMT     Tests     Left Hip   Negative BENNETT, FADIR, piriformis and scour  Right Hip   Negative BENNETT, FADIR and piriformis  Left Knee   Negative anterior drawer, lateral Tawana, medial Tawana, valgus stress test at 0 degrees, valgus stress test at 30 degrees, varus stress test at 0 degrees and varus stress test at 30 degrees  Right Knee   Negative anterior drawer, lateral Tawana, medial Tawana, valgus stress test at 0 degrees, valgus stress test at 30 degrees, varus stress test at 0 degrees and varus stress test at 30 degrees  Additional Tests Details  + slump on R     Functional Assessment      Squat    Left within functional limits and right within functional limits  Forward Step Up 8"   Left Leg  Within functional limits  No pain  Right Leg  No pain and no valgus  Forward Step Down 8"   Left Leg  No pain and no valgus  Right Leg  No pain and no valgus       Single Leg Stance - Eyes Open   Left  Trial 1: 16 seconds    Right  Trial 1: 12 seconds  Comments: Right hip and knee pain reported         Comments  STS: able to perform with equal WB, no UE use     General Comments:      Lumbar Comments  Further assessment of lumbar spine is advised, limited due to time constraints              Precautions: standard   HEP created through [address] [place] [address], access code: [email or web address] updated 9/26  IE: 7/6  Re-eval 9/26     16 15 14 13 12   Manuals 9/26 re-eval  8/31 8/29 8/24 8/17   Mobs  End-range extension mobs grade III/IV R  End-range extension mobs grade III R   Extension mobs grade III/IV R + prox fib head grade III Extension mobs grade III/IV   Contract relax     HS stretch 2 min    STM   Right glute, along ITB Right popliteus             Neuro Re-Ed        Fwd lunge onto bosu  Split squat 10x total    15x ea    Step ups     6"+ airex 15x ea  6" + airex 20x ea   Step downs         Hip drops standing         Seated on/off 6" step w/quad set + SLR        SLS clocks   LOS on biodex static 1x SLS hold 4x10s     Tandem balance         Hip ABD isometric into step     10x5s hold ea    Skater    ML/AP 20x5s  ea     Wobble board      2 min ML/AP ea   Ther Ex        Piriformis stretch         Mod chaparrita stretch  Quad stretch foot on chair 2x30s belinda       Hamstring stretch         SLR         Bike  5 min upright  5 min upright  5 min upright lvl 3    Supine hip ER/IR         Clamshell    2x10     Heel slide with quad set         gastroc stretch         STS     10lbs 3x10     Mini squat         bridge   2x10      Backward walk at [employer]  Side stepping and backwards walking at [employer] 6 laps ea with resistance      Side stepping      4 laps at mirror    Leg press   75lbs 4x15      Standing hip abd/ext    david 2 5 20x ea belinda  20x ea belinda   Reverse clamshell 10x       clamshell 10x       HEP education 8 min            Insurance:  AMA/LE Stubbs/ Gwendolyn Moreau #/ Referral # Visit limitation? PT only or  PT+OT?  Co-Insurance   [facility] 7/6 No auth  BOMN  $30 Co-pay